# Patient Record
Sex: FEMALE | Race: WHITE | NOT HISPANIC OR LATINO | Employment: OTHER | URBAN - METROPOLITAN AREA
[De-identification: names, ages, dates, MRNs, and addresses within clinical notes are randomized per-mention and may not be internally consistent; named-entity substitution may affect disease eponyms.]

---

## 2017-03-13 ENCOUNTER — OUTPATIENT INFUSION SERVICES (OUTPATIENT)
Dept: ONCOLOGY | Facility: MEDICAL CENTER | Age: 72
End: 2017-03-13
Attending: FAMILY MEDICINE
Payer: MEDICARE

## 2017-03-13 VITALS
HEIGHT: 64 IN | DIASTOLIC BLOOD PRESSURE: 55 MMHG | RESPIRATION RATE: 18 BRPM | OXYGEN SATURATION: 93 % | SYSTOLIC BLOOD PRESSURE: 103 MMHG | TEMPERATURE: 97.5 F | HEART RATE: 62 BPM | BODY MASS INDEX: 21.23 KG/M2 | WEIGHT: 124.34 LBS

## 2017-03-13 LAB
CA-I BLD ISE-SCNC: 1.18 MMOL/L (ref 1.1–1.3)
CREAT BLD-MCNC: 0.8 MG/DL (ref 0.5–1.4)

## 2017-03-13 PROCEDURE — 700111 HCHG RX REV CODE 636 W/ 250 OVERRIDE (IP): Performed by: FAMILY MEDICINE

## 2017-03-13 PROCEDURE — 36415 COLL VENOUS BLD VENIPUNCTURE: CPT

## 2017-03-13 PROCEDURE — 82565 ASSAY OF CREATININE: CPT

## 2017-03-13 PROCEDURE — 82330 ASSAY OF CALCIUM: CPT

## 2017-03-13 PROCEDURE — 96365 THER/PROPH/DIAG IV INF INIT: CPT

## 2017-03-13 RX ORDER — NIFEDIPINE 10 MG/1
10 CAPSULE ORAL 3 TIMES DAILY
COMMUNITY

## 2017-03-13 RX ORDER — ESCITALOPRAM OXALATE 10 MG/1
10 TABLET ORAL DAILY
COMMUNITY

## 2017-03-13 RX ORDER — ZOLEDRONIC ACID 5 MG/100ML
5 INJECTION, SOLUTION INTRAVENOUS ONCE
Status: COMPLETED | OUTPATIENT
Start: 2017-03-13 | End: 2017-03-13

## 2017-03-13 RX ADMIN — ZOLEDRONIC ACID 5 MG: 0.05 INJECTION, SOLUTION INTRAVENOUS at 10:25

## 2017-03-13 ASSESSMENT — PAIN SCALES - GENERAL: PAINLEVEL: NO PAIN

## 2017-03-13 NOTE — PROGRESS NOTES
Pharmacy Note:    Scr = 0.8,  est crcl ~ 55.8ml/min  iCalcium 1.18  Previous Reclast at [facility] = none    Ok to proceed with reclast 5 mg infusion  KACEY Kimble [employer]iar[employer].

## 2017-03-14 NOTE — PROGRESS NOTES
Pt arrives ambulatory to [facility] accompanied by self.  Pt is here for Reclast.  Labs drawn as ordered and results reviewed.  Reclast infused as ordered.    Discussed post Reclast care, pt states understanding and compliance with same.  Pt states she has received Reclast in the past in [place], and she tells me she has never had any problems during or after.  Pt tolerated Reclast well, no evidence of adverse effects noted or expressed.  Pt dc'd to self care in no apparent distress.  Discussed returning in one year, pt states understanding.

## 2017-04-25 ENCOUNTER — APPOINTMENT (OUTPATIENT)
Dept: RADIOLOGY | Facility: MEDICAL CENTER | Age: 72
End: 2017-04-25
Attending: EMERGENCY MEDICINE
Payer: MEDICARE

## 2017-04-25 ENCOUNTER — HOSPITAL ENCOUNTER (EMERGENCY)
Facility: MEDICAL CENTER | Age: 72
End: 2017-04-25
Attending: EMERGENCY MEDICINE
Payer: MEDICARE

## 2017-04-25 VITALS
RESPIRATION RATE: 18 BRPM | HEIGHT: 64 IN | HEART RATE: 56 BPM | WEIGHT: 120.37 LBS | OXYGEN SATURATION: 96 % | SYSTOLIC BLOOD PRESSURE: 188 MMHG | BODY MASS INDEX: 20.55 KG/M2 | DIASTOLIC BLOOD PRESSURE: 93 MMHG | TEMPERATURE: 97.4 F

## 2017-04-25 DIAGNOSIS — S39.012A LOW BACK STRAIN, INITIAL ENCOUNTER: ICD-10-CM

## 2017-04-25 DIAGNOSIS — S70.02XA CONTUSION OF LEFT HIP, INITIAL ENCOUNTER: ICD-10-CM

## 2017-04-25 PROCEDURE — 99284 EMERGENCY DEPT VISIT MOD MDM: CPT

## 2017-04-25 PROCEDURE — 72100 X-RAY EXAM L-S SPINE 2/3 VWS: CPT

## 2017-04-25 PROCEDURE — 72170 X-RAY EXAM OF PELVIS: CPT

## 2017-04-25 ASSESSMENT — PAIN SCALES - GENERAL: PAINLEVEL_OUTOF10: 4

## 2017-04-25 NOTE — ED AVS SNAPSHOT
4/25/2017    Priscilla Alonzo  [address]   [place] [address]    Dear Priscilla:    [facility] wants to ensure your discharge home is safe and you or your loved ones have had all of your questions answered regarding your care after you leave the hospital.    Below is a list of resources and contact information should you have any questions regarding your hospital stay, follow-up instructions, or active medical symptoms.    Questions or Concerns Regarding… Contact   Medical Questions Related to Your Discharge  (7 days a week, 8am-5pm) Contact a Nurse Care Coordinator   [phone]   Medical Questions Not Related to Your Discharge  (24 hours a day / 7 days a week)  Contact the Nurse Health Line   [phone]    Medications or Discharge Instructions Refer to your discharge packet   or contact your [facility] Primary Care Provider   [phone]   Follow-up Appointment(s) Schedule your appointment via [employer]   or contact Scheduling [phone]   Billing Review your statement via [employer]  or contact Billing [phone]   Medical Records Review your records via [employer]   or contact Medical Records [phone]     You may receive a telephone call within two days of discharge. This call is to make certain you understand your discharge instructions and have the opportunity to have any questions answered. You can also easily access your medical information, test results and upcoming appointments via the [employer] free online health management tool. You can learn more and sign up at [employer]. For assistance setting up your [employer] account, please call [phone].    Once again, we want to ensure your discharge home is safe and that you have a clear understanding of any next steps in your care. If you have any questions or concerns, please do not hesitate to contact us, we are here for you. Thank you for choosing [facility] for your healthcare needs.    Sincerely,    Your [facility] Healthcare Team

## 2017-04-25 NOTE — ED AVS SNAPSHOT
Home Care Instructions                                                                                                                Priscilla Alonzo   MRN: [id number]    Department:  [facility], Emergency Dept   Date of Visit:  4/25/2017            [facility], Emergency Dept    [address]    [place] [address]    Phone:  [phone]      You were seen by     Shubham De La Cruz M.D.      Your Diagnosis Was     Low back strain, initial encounter     S39.012A       Follow-up Information     1. Follow up with Angie Alonso M.D..    Specialty:  Family Medicine    Why:  use tylenol, heat/ice, gentla massage, stretches as needed for pain. return to ER for changes/worsening    Contact information    [address]  [address]  [place] [address] [phone]          2. Follow up with Juanito Carey M.D..    Specialty:  Orthopaedics    Why:  As needed    Contact information    [address]  [address]  [place] [address] [phone]        Medication Information     Review all of your home medications and newly ordered medications with your primary doctor and/or pharmacist as soon as possible. Follow medication instructions as directed by your doctor and/or pharmacist.     Please keep your complete medication list with you and share with your physician. Update the information when medications are discontinued, doses are changed, or new medications (including over-the-counter products) are added; and carry medication information at all times in the event of emergency situations.               Medication List      ASK your doctor about these medications        Instructions    Morning Afternoon Evening Bedtime    AMBIEN 10 MG Tabs   Generic drug:  zolpidem        Take 5 mg by mouth at bedtime as needed.   Dose:  5 mg                        atenolol 50 MG Tabs   Commonly known as:  TENORMIN        BID.                        CITRACAL CALCIUM+D 600- MG-MG-UNIT Tb24      Generic drug:  Calcium-Magnesium-Vitamin D        Take  by mouth.                        LEXAPRO 10 MG Tabs   Generic drug:  escitalopram        Take 10 mg by mouth every day.   Dose:  10 mg                        MULTIVITAMINS PO        QDAY.                        NIFEdipine 10 MG Caps   Commonly known as:  PROCARDIA        Take 10 mg by mouth 3 times a day.   Dose:  10 mg                        SYNTHROID 88 MCG Tabs   Generic drug:  levothyroxine        QDAY.                        vitamin D 1000 UNIT Tabs   Commonly known as:  cholecalciferol        Take  by mouth every day.                        VIVELLE-DOT 0.0375 MG/24HR patch   Generic drug:  estradiol        Apply 1 Patch to skin as directed every 7 days. Use one patch two times per week.   Dose:  1 Patch                                Procedures and tests performed during your visit     DX-LUMBAR SPINE-2 OR 3 VIEWS    DX-PELVIS-1 OR 2 VIEWS        Discharge Instructions       Bone Bruise   A bone bruise is a small hidden fracture of the bone. It typically occurs with bones located close to the surface of the skin.   SYMPTOMS  · The pain lasts longer than a normal bruise.  · The bruised area is difficult to use.  · There may be discoloration or swelling of the bruised area.  · When a bone bruise is found with injury to the anterior cruciate ligament (in the knee) there is often an increased:  · Amount of fluid in the knee  · Time the fluid in the knee lasts.  · Number of days until you are walking normally and regaining the motion you had before the injury.  · Number of days with pain from the injury.  DIAGNOSIS   It can only be seen on X-rays known as MRIs. This stands for magnetic resonance imaging. A regular X-ray taken of a bone bruise would appear to be normal. A bone bruise is a common injury in the knee and the heel bone (calcaneus). The problems are similar to those produced by stress fractures, which are bone injuries caused by overuse. A bone  bruise may also be a sign of other injuries. For example, bone bruises are commonly found where an anterior cruciate ligament (ACL) in the knee has been pulled away from the bone (ruptured). A ligament is a tough fibrous material that connects bones together to make our joints stable. Bruises of the bone last a lot longer than bruises of the muscle or tissues beneath the skin. Bone bruises can last from days to months and are often more severe and painful than other bruises.  TREATMENT  Because bone bruises are sudden injuries you cannot often prevent them, other than by being extremely careful. Some things you can do to improve the condition are:  · Apply ice to the sore area for 15-20 minutes, 3-4 times per day while awake for the first 2 days. Put the ice in a plastic bag, and place a towel between the bag of ice and your skin.  · Keep your bruised area raised (elevated) when possible to lessen swelling.  · For activity:  · Use crutches when necessary; do not put weight on the injured leg until you are no longer tender.  · You may walk on your affected part as the pain allows, or as instructed.  · Start weight bearing gradually on the bruised part.  · Continue to use crutches or a cane until you can stand without causing pain, or as instructed.  · If a plaster splint was applied, wear the splint until you are seen for a follow-up examination. Rest it on nothing harder than a pillow the first 24 hours. Do not put weight on it. Do not get it wet. You may take it off to take a shower or bath.  · If an air splint was applied, more air may be blown into or out of the splint as needed for comfort. You may take it off at night and to take a shower or bath.  · Wiggle your toes in the splint several times per day if you are able.  · You may have been given an elastic bandage to use with the plaster splint or alone. The splint is too tight if you have numbness, tingling or if your foot becomes cold and blue. Adjust the  bandage to make it comfortable.  · Only take over-the-counter or prescription medicines for pain, discomfort, or fever as directed by your caregiver.  · Follow all instructions for follow up with your caregiver. This includes any orthopedic referrals, physical therapy, and rehabilitation. Any delay in obtaining necessary care could result in a delay or failure of the bones to heal.  SEEK MEDICAL CARE IF:   · You have an increase in bruising, swelling, or pain.  · You notice coldness of your toes.  · You do not get pain relief with medications.  SEEK IMMEDIATE MEDICAL CARE IF:   · Your toes are numb or blue.  · You have severe pain not controlled with medications.  · If any of the problems that caused you to seek care are becoming worse.  Document Released: 03/09/2005 Document Revised: 03/11/2013 Document Reviewed: 07/22/2009  [employer]Care® Patient Information ©2014 [employer].    Lumbosacral Strain  Lumbosacral strain is a strain of any of the parts that make up your lumbosacral vertebrae. Your lumbosacral vertebrae are the bones that make up the lower third of your backbone. Your lumbosacral vertebrae are held together by muscles and tough, fibrous tissue (ligaments).   CAUSES   A sudden blow to your back can cause lumbosacral strain. Also, anything that causes an excessive stretch of the muscles in the low back can cause this strain. This is typically seen when people exert themselves strenuously, fall, lift heavy objects, bend, or crouch repeatedly.  RISK FACTORS  · Physically demanding work.  · Participation in pushing or pulling sports or sports that require a sudden twist of the back (tennis, golf, baseball).  · Weight lifting.  · Excessive lower back curvature.  · Forward-tilted pelvis.  · Weak back or abdominal muscles or both.  · Tight hamstrings.  SIGNS AND SYMPTOMS   Lumbosacral strain may cause pain in the area of your injury or pain that moves (radiates) down your leg.   DIAGNOSIS  Your health care  provider can often diagnose lumbosacral strain through a physical exam. In some cases, you may need tests such as X-ray exams.   TREATMENT   Treatment for your lower back injury depends on many factors that your clinician will have to evaluate. However, most treatment will include the use of anti-inflammatory medicines.  HOME CARE INSTRUCTIONS   · Avoid hard physical activities (tennis, racquetball, waterskiing) if you are not in proper physical condition for it. This may aggravate or create problems.  · If you have a back problem, avoid sports requiring sudden body movements. Swimming and walking are generally safer activities.  · Maintain good posture.  · Maintain a healthy weight.  · For acute conditions, you may put ice on the injured area.  ¨ Put ice in a plastic bag.  ¨ Place a towel between your skin and the bag.  ¨ Leave the ice on for 20 minutes, 2-3 times a day.  · When the low back starts healing, stretching and strengthening exercises may be recommended.  SEEK MEDICAL CARE IF:  · Your back pain is getting worse.  · You experience severe back pain not relieved with medicines.  SEEK IMMEDIATE MEDICAL CARE IF:   · You have numbness, tingling, weakness, or problems with the use of your arms or legs.  · There is a change in bowel or bladder control.  · You have increasing pain in any area of the body, including your belly (abdomen).  · You notice shortness of breath, dizziness, or feel faint.  · You feel sick to your stomach (nauseous), are throwing up (vomiting), or become sweaty.  · You notice discoloration of your toes or legs, or your feet get very cold.  MAKE SURE YOU:   · Understand these instructions.  · Will watch your condition.  · Will get help right away if you are not doing well or get worse.     This information is not intended to replace advice given to you by your health care provider. Make sure you discuss any questions you have with your health care provider.     Document Released: 09/27/2006  Document Revised: 01/08/2016 Document Reviewed: 08/06/2014  Elsevier Interactive Patient Education ©2016 [employer]            Patient Information     Patient Information    Following emergency treatment: all patient requiring follow-up care must return either to a private physician or a clinic if your condition worsens before you are able to obtain further medical attention, please return to the emergency room.     Billing Information    At [facility], we work to make the billing process streamlined for our patients.  Our Representatives are here to answer any questions you may have regarding your hospital bill.  If you have insurance coverage and have supplied your insurance information to us, we will submit a claim to your insurer on your behalf.  Should you have any questions regarding your bill, we can be reached online or by phone as follows:  Online: You are able pay your bills online or live chat with our representatives about any billing questions you may have. We are here to help Monday - Friday from 8:00am to 7:30pm and 9:00am - 12:00pm on Saturdays.  Please visit [email or web address]  for more information.   Phone:  [phone] or [phone]    Please note that your emergency physician, surgeon, pathologist, radiologist, anesthesiologist, and other specialists are not employed by [facility] and will therefore bill separately for their services.  Please contact them directly for any questions concerning their bills at the numbers below:     Emergency Physician Services:  [phone]  [facility]:  [phone]  [facility]:  [phone]  [facility]:  [phone]    1. Your final bill may vary from the amount quoted upon discharge if all procedures are not complete at that time, or if your doctor has additional procedures of which we are not aware. You will receive an additional bill if you return to the Emergency  Department at [facility] for suture removal regardless of the facility of which the sutures were placed.     2. Please arrange for settlement of this account at the emergency registration.    3. All self-pay accounts are due in full at the time of treatment.  If you are unable to meet this obligation then payment is expected within 4-5 days.     4. If you have had radiology studies (CT, X-ray, Ultrasound, MRI), you have received a preliminary result during your emergency department visit. Please contact the radiology department [phone] to receive a copy of your final result. Please discuss the Final result with your primary physician or with the follow up physician provided.     Crisis Hotline:  [place] Crisis Hotline:  0-027-TQCKQIH or 1-223.705.6667  [place] Crisis Hotline:    1-974.408.7270 or 578-238-2399         ED Discharge Follow Up Questions    1. In order to provide you with very good care, we would like to follow up with a phone call in the next few days.  May we have your permission to contact you?     YES /  NO    2. What is the best phone number to call you? (       )_____-__________    3. What is the best time to call you?      Morning  /  Afternoon  /  Evening                   Patient Signature:  ____________________________________________________________    Date:  ____________________________________________________________

## 2017-04-25 NOTE — ED AVS SNAPSHOT
Isoflux Access Code: [email or web address]  Expires: 5/25/2017  7:49 PM    Your email address is not on file at [employer].  Email Addresses are required for you to sign up for [employer], please contact [phone] to verify your personal information and to provide your email address prior to attempting to register for Iso[employer].    Priscilla ADELE [place]  [address] [place] [address]    [employer]  A secure, online tool to manage your health information     [employer]’s Isoflux® is a secure, online tool that connects you to your personalized health information from the privacy of your home -- day or night - making it very easy for you to manage your healthcare. Once the activation process is completed, you can even access your medical information using the Isoflux karen, which is available for free in the Apple Karen store or Google Play store.     To learn more about Isoflux, visit [email or web address]    There are two levels of access available (as shown below):   My Chart Features  [facility] Primary Care Doctor [facility]  Specialists [facility]  Urgent  Care Non-[facility] Primary Care Doctor   Email your healthcare team securely and privately 24/7 X X X    Manage appointments: schedule your next appointment; view details of past/upcoming appointments X      Request prescription refills. X      View recent personal medical records, including lab and immunizations X X X X   View health record, including health history, allergies, medications X X X X   Read reports about your outpatient visits, procedures, consult and ER notes X X X X   See your discharge summary, which is a recap of your hospital and/or ER visit that includes your diagnosis, lab results, and care plan X X  X     How to register for Good[employer]:  Once your e-mail address has been verified, follow the following steps to sign up for Good[employer].     1. Go to  [email or web address]  2. Click on the Sign Up Now box, which takes you to the New Member Sign Up  page. You will need to provide the following information:  a. Enter your [employer] Access Code exactly as it appears at the top of this page. (You will not need to use this code after you’ve completed the sign-up process. If you do not sign up before the expiration date, you must request a new code.)   b. Enter your date of birth.   c. Enter your home email address.   d. Click Submit, and follow the next screen’s instructions.  3. Create a [employer] ID. This will be your [employer] login ID and cannot be changed, so think of one that is secure and easy to remember.  4. Create a [employer] password. You can change your password at any time.  5. Enter your Password Reset Question and Answer. This can be used at a later time if you forget your password.   6. Enter your e-mail address. This allows you to receive e-mail notifications when new information is available in [employer].  7. Click Sign Up. You can now view your health information.    For assistance activating your [employer] account, call [phone]

## 2017-04-26 NOTE — ED NOTES
Discharge instructions given. Educated on when to return to ed. Pt verbalized understanding. Pt hypertensive at discharge. Dr De La Cruz aware. Pt states she takes atenolol at bedtime.

## 2017-04-26 NOTE — DISCHARGE INSTRUCTIONS
Bone Bruise   A bone bruise is a small hidden fracture of the bone. It typically occurs with bones located close to the surface of the skin.   SYMPTOMS  · The pain lasts longer than a normal bruise.  · The bruised area is difficult to use.  · There may be discoloration or swelling of the bruised area.  · When a bone bruise is found with injury to the anterior cruciate ligament (in the knee) there is often an increased:  · Amount of fluid in the knee  · Time the fluid in the knee lasts.  · Number of days until you are walking normally and regaining the motion you had before the injury.  · Number of days with pain from the injury.  DIAGNOSIS   It can only be seen on X-rays known as MRIs. This stands for magnetic resonance imaging. A regular X-ray taken of a bone bruise would appear to be normal. A bone bruise is a common injury in the knee and the heel bone (calcaneus). The problems are similar to those produced by stress fractures, which are bone injuries caused by overuse. A bone bruise may also be a sign of other injuries. For example, bone bruises are commonly found where an anterior cruciate ligament (ACL) in the knee has been pulled away from the bone (ruptured). A ligament is a tough fibrous material that connects bones together to make our joints stable. Bruises of the bone last a lot longer than bruises of the muscle or tissues beneath the skin. Bone bruises can last from days to months and are often more severe and painful than other bruises.  TREATMENT  Because bone bruises are sudden injuries you cannot often prevent them, other than by being extremely careful. Some things you can do to improve the condition are:  · Apply ice to the sore area for 15-20 minutes, 3-4 times per day while awake for the first 2 days. Put the ice in a plastic bag, and place a towel between the bag of ice and your skin.  · Keep your bruised area raised (elevated) when possible to lessen swelling.  · For activity:  · Use crutches  when necessary; do not put weight on the injured leg until you are no longer tender.  · You may walk on your affected part as the pain allows, or as instructed.  · Start weight bearing gradually on the bruised part.  · Continue to use crutches or a cane until you can stand without causing pain, or as instructed.  · If a plaster splint was applied, wear the splint until you are seen for a follow-up examination. Rest it on nothing harder than a pillow the first 24 hours. Do not put weight on it. Do not get it wet. You may take it off to take a shower or bath.  · If an air splint was applied, more air may be blown into or out of the splint as needed for comfort. You may take it off at night and to take a shower or bath.  · Wiggle your toes in the splint several times per day if you are able.  · You may have been given an elastic bandage to use with the plaster splint or alone. The splint is too tight if you have numbness, tingling or if your foot becomes cold and blue. Adjust the bandage to make it comfortable.  · Only take over-the-counter or prescription medicines for pain, discomfort, or fever as directed by your caregiver.  · Follow all instructions for follow up with your caregiver. This includes any orthopedic referrals, physical therapy, and rehabilitation. Any delay in obtaining necessary care could result in a delay or failure of the bones to heal.  SEEK MEDICAL CARE IF:   · You have an increase in bruising, swelling, or pain.  · You notice coldness of your toes.  · You do not get pain relief with medications.  SEEK IMMEDIATE MEDICAL CARE IF:   · Your toes are numb or blue.  · You have severe pain not controlled with medications.  · If any of the problems that caused you to seek care are becoming worse.  Document Released: 03/09/2005 Document Revised: 03/11/2013 Document Reviewed: 07/22/2009  [employer]® Patient Information ©2014 [employer].    Lumbosacral Strain  Lumbosacral strain is a strain of any of the  parts that make up your lumbosacral vertebrae. Your lumbosacral vertebrae are the bones that make up the lower third of your backbone. Your lumbosacral vertebrae are held together by muscles and tough, fibrous tissue (ligaments).   CAUSES   A sudden blow to your back can cause lumbosacral strain. Also, anything that causes an excessive stretch of the muscles in the low back can cause this strain. This is typically seen when people exert themselves strenuously, fall, lift heavy objects, bend, or crouch repeatedly.  RISK FACTORS  · Physically demanding work.  · Participation in pushing or pulling sports or sports that require a sudden twist of the back (tennis, golf, baseball).  · Weight lifting.  · Excessive lower back curvature.  · Forward-tilted pelvis.  · Weak back or abdominal muscles or both.  · Tight hamstrings.  SIGNS AND SYMPTOMS   Lumbosacral strain may cause pain in the area of your injury or pain that moves (radiates) down your leg.   DIAGNOSIS  Your health care provider can often diagnose lumbosacral strain through a physical exam. In some cases, you may need tests such as X-ray exams.   TREATMENT   Treatment for your lower back injury depends on many factors that your clinician will have to evaluate. However, most treatment will include the use of anti-inflammatory medicines.  HOME CARE INSTRUCTIONS   · Avoid hard physical activities (tennis, racquetball, waterskiing) if you are not in proper physical condition for it. This may aggravate or create problems.  · If you have a back problem, avoid sports requiring sudden body movements. Swimming and walking are generally safer activities.  · Maintain good posture.  · Maintain a healthy weight.  · For acute conditions, you may put ice on the injured area.  ¨ Put ice in a plastic bag.  ¨ Place a towel between your skin and the bag.  ¨ Leave the ice on for 20 minutes, 2-3 times a day.  · When the low back starts healing, stretching and strengthening exercises  may be recommended.  SEEK MEDICAL CARE IF:  · Your back pain is getting worse.  · You experience severe back pain not relieved with medicines.  SEEK IMMEDIATE MEDICAL CARE IF:   · You have numbness, tingling, weakness, or problems with the use of your arms or legs.  · There is a change in bowel or bladder control.  · You have increasing pain in any area of the body, including your belly (abdomen).  · You notice shortness of breath, dizziness, or feel faint.  · You feel sick to your stomach (nauseous), are throwing up (vomiting), or become sweaty.  · You notice discoloration of your toes or legs, or your feet get very cold.  MAKE SURE YOU:   · Understand these instructions.  · Will watch your condition.  · Will get help right away if you are not doing well or get worse.     This information is not intended to replace advice given to you by your health care provider. Make sure you discuss any questions you have with your health care provider.     Document Released: 09/27/2006 Document Revised: 01/08/2016 Document Reviewed: 08/06/2014  [employer] Interactive Patient Education ©2016 [employer]

## 2017-04-26 NOTE — ED NOTES
Pt taken for a walk around the ED, she felt very comfortable on her feet and looked very stable walking.

## 2017-04-26 NOTE — ED NOTES
"Pt stated went to the [facility] and did not get a urine sample.  \"No one told me.\"  Pt aware need urine sample    "

## 2017-04-26 NOTE — ED PROVIDER NOTES
ED Provider Note    CHIEF COMPLAINT  Chief Complaint   Patient presents with   • Fall   • Hip Injury       HPI  Priscilla Alonzo is a 71 y.o. female who presents to the emergency department complaining of left low back and hip discomfort after mechanical fall. Past medical history is documented in review with patient. She said that she was pulling a cristina backwards along the side of her house to move a pot when she tripped over some nearby bricks. As she fell backwards the left low back grazed a nearby propane canister before she hit the ground. She did not hit her head. No loss of consciousness. No neck or upper back pain. Endotracheal the event. Event happened approximately 3 hours ago. No medications taken prior to arrival. Pain is currently minimal. No significant aggravating factors. No numbness or tingling.    REVIEW OF SYSTEMS  See HPI for further details. All other systems are negative.     PAST MEDICAL HISTORY   has a past medical history of Anesthesia; Joint replacement; Unspecified hemorrhagic conditions (CMS-HCC); Unspecified disorder of thyroid; Hypertension; Indigestion; and Cancer (CMS-HCC).    SOCIAL HISTORY  Social History     Social History Main Topics   • Smoking status: Never Smoker    • Smokeless tobacco: Never Used   • Alcohol Use: Yes      Comment: 0-2 per week   • Drug Use: No   • Sexual Activity: Not on file       SURGICAL HISTORY   has past surgical history that includes cervical fusion posterior (2005); hysterectomy, total abdominal (1985); zeinab by laparoscopy (7/28/08); liver biopsy laparoscopic (7/28/08); shoulder arthroscopy (1995); cystoscopy (1/20/2009); ureteral dilatation (1/20/2009); cystoscopy (3/31/2009); lumbar laminectomy diskectomy (1/8/2012); lumbar decompression (1/8/2012); gyn surgery; anterior and posterior repair (1/20/2009); anterior repair (3/31/2009); anterior repair (8/28/2012); posterior repair (8/28/2012); and cystoscopy (8/28/2012).    CURRENT  "MEDICATIONS  Home Medications     Reviewed by Mk Aguilera R.N. (Registered Nurse) on 04/25/17 at 1716  Med List Status: Complete    Medication Last Dose Status    ATENOLOL 50 MG PO TABS 4/25/2017 Active    Calcium-Magnesium-Vitamin D (CITRACAL CALCIUM+D) 600- MG-MG-UNIT TB24 taking Active    escitalopram (LEXAPRO) 10 MG Tab 4/24/2017 Active    estradiol (VIVELLE-DOT) 0.0375 MG/24HR patch 4/24/2017 Active    MULTIVITAMINS PO taking Active    NIFEdipine (PROCARDIA) 10 MG Cap 4/25/2017 Active    SYNTHROID 88 MCG PO TABS 4/25/2017 Active    VITAMIN D 1000 UNIT PO TABS taking Active    zolpidem (AMBIEN) 10 MG TABS taking Active                ALLERGIES  Allergies   Allergen Reactions   • Actonel [Risedronate Sodium]      Swelling in knees   • Boniva [Ibandronate Sodium] Vomiting   • Levaquin      Aching back of knees       PHYSICAL EXAM  VITAL SIGNS: /84 mmHg  Pulse 60  Temp(Src) 36.3 °C (97.4 °F)  Resp 18  Ht 1.626 m (5' 4\")  Wt 54.6 kg (120 lb 5.9 oz)  BMI 20.65 kg/m2  SpO2 96%   Pulse ox interpretation: I interpret this pulse ox as normal.  Constitutional: Alert in no apparent distress.  HENT: No signs of trauma, Bilateral external ears normal, Nose normal.   Eyes: Pupils are equal and reactive, Conjunctiva normal, Non-icteric.   Neck: Normal range of motion, No tenderness, Supple, No stridor.   Lymphatic: No lymphadenopathy noted.   Cardiovascular: Regular rate and rhythm, no murmurs.   Thorax & Lungs: Normal breath sounds, No respiratory distress, No wheezing, No chest tenderness.   Abdomen: Bowel sounds normal, Soft, No tenderness, No masses, No pulsatile masses. No peritoneal signs.  Skin: Warm, Dry, No erythema, No rash.   Back: No bony tenderness, left paralumbar muscular tenderness as well as superior SI joint tenderness on the left.  Extremities: Intact distal pulses, No edema, No tenderness, No cyanosis, strength 5 out of 5 all extremities.  Musculoskeletal: Good range of motion in " all major joints. No tenderness to palpation or major deformities noted.   Neurologic: Alert , Normal motor function, Normal sensory function, No focal deficits noted.   Psychiatric: Affect normal, Judgment normal, Mood normal.       DIAGNOSTIC STUDIES / PROCEDURES        RADIOLOGY  DX-PELVIS-1 OR 2 VIEWS   Final Result         1.  Normal single AP view of the pelvis.      DX-LUMBAR SPINE-2 OR 3 VIEWS   Final Result      1.  Minimal levoscoliosis mid lumbar spine.      2.  No evidence of acute fracture and/or subluxation.              COURSE & MEDICAL DECISION MAKING  Pertinent Labs & Imaging studies reviewed. (See chart for details)  Patient presented to the emergency department after mechanical ground level fall. History and physical exam as above. No acute findings of trauma on exam other than minimal tenderness as described above. Imaging does not reveal any acute bony abnormalities. Likely low back strain with bony contusion of pelvis. She had limited without difficulty. We'll discharge home with outpatient ball by primary care physician or orthopedics as needed. The patient is understanding of reasons for emergency return to ER. She will continue conservative over-the-counter medications and therapy otherwise. Screening vital signs do show elevated blood pressure. This may be secondary to current minimal pain however the patient will continue to have this monitored and treated as an outpatient as needed and directed by her primary care physician.     The patient will return for worsening symptoms and is stable at the time of discharge. The patient verbalizes understanding and will comply.    FINAL IMPRESSION  1. Low back strain, initial encounter    2. Contusion of left hip, initial encounter            Electronically signed by: Shubham De La Cruz, 4/25/2017 6:42 PM

## 2017-12-15 ENCOUNTER — APPOINTMENT (RX ONLY)
Dept: URBAN - METROPOLITAN AREA CLINIC 4 | Facility: CLINIC | Age: 72
Setting detail: DERMATOLOGY
End: 2017-12-15

## 2017-12-15 DIAGNOSIS — D22 MELANOCYTIC NEVI: ICD-10-CM

## 2017-12-15 DIAGNOSIS — L80 VITILIGO: ICD-10-CM

## 2017-12-15 DIAGNOSIS — L57.0 ACTINIC KERATOSIS: ICD-10-CM

## 2017-12-15 DIAGNOSIS — D18.0 HEMANGIOMA: ICD-10-CM

## 2017-12-15 DIAGNOSIS — L82.1 OTHER SEBORRHEIC KERATOSIS: ICD-10-CM

## 2017-12-15 DIAGNOSIS — L82.0 INFLAMED SEBORRHEIC KERATOSIS: ICD-10-CM

## 2017-12-15 DIAGNOSIS — D17 BENIGN LIPOMATOUS NEOPLASM: ICD-10-CM

## 2017-12-15 DIAGNOSIS — L81.4 OTHER MELANIN HYPERPIGMENTATION: ICD-10-CM

## 2017-12-15 PROBLEM — D22.62 MELANOCYTIC NEVI OF LEFT UPPER LIMB, INCLUDING SHOULDER: Status: ACTIVE | Noted: 2017-12-15

## 2017-12-15 PROBLEM — D22.71 MELANOCYTIC NEVI OF RIGHT LOWER LIMB, INCLUDING HIP: Status: ACTIVE | Noted: 2017-12-15

## 2017-12-15 PROBLEM — D22.5 MELANOCYTIC NEVI OF TRUNK: Status: ACTIVE | Noted: 2017-12-15

## 2017-12-15 PROBLEM — D17.24 BENIGN LIPOMATOUS NEOPLASM OF SKIN AND SUBCUTANEOUS TISSUE OF LEFT LEG: Status: ACTIVE | Noted: 2017-12-15

## 2017-12-15 PROBLEM — D22.61 MELANOCYTIC NEVI OF RIGHT UPPER LIMB, INCLUDING SHOULDER: Status: ACTIVE | Noted: 2017-12-15

## 2017-12-15 PROBLEM — D22.72 MELANOCYTIC NEVI OF LEFT LOWER LIMB, INCLUDING HIP: Status: ACTIVE | Noted: 2017-12-15

## 2017-12-15 PROBLEM — D18.01 HEMANGIOMA OF SKIN AND SUBCUTANEOUS TISSUE: Status: ACTIVE | Noted: 2017-12-15

## 2017-12-15 PROCEDURE — 17000 DESTRUCT PREMALG LESION: CPT | Mod: 59

## 2017-12-15 PROCEDURE — 17110 DESTRUCTION B9 LES UP TO 14: CPT

## 2017-12-15 PROCEDURE — ? LIQUID NITROGEN

## 2017-12-15 PROCEDURE — ? SUNSCREEN RECOMMENDATIONS

## 2017-12-15 PROCEDURE — ? COUNSELING

## 2017-12-15 PROCEDURE — 17003 DESTRUCT PREMALG LES 2-14: CPT

## 2017-12-15 PROCEDURE — 99213 OFFICE O/P EST LOW 20 MIN: CPT | Mod: 25

## 2017-12-15 ASSESSMENT — LOCATION SIMPLE DESCRIPTION DERM
LOCATION SIMPLE: RIGHT LOWER BACK
LOCATION SIMPLE: RIGHT POSTERIOR UPPER ARM
LOCATION SIMPLE: LEFT POPLITEAL SKIN
LOCATION SIMPLE: LEFT CHEEK
LOCATION SIMPLE: LEFT UPPER BACK
LOCATION SIMPLE: LEFT POSTERIOR UPPER ARM
LOCATION SIMPLE: LEFT EAR
LOCATION SIMPLE: ABDOMEN
LOCATION SIMPLE: LEFT FOREARM
LOCATION SIMPLE: RIGHT FOREHEAD
LOCATION SIMPLE: RIGHT CHEEK
LOCATION SIMPLE: RIGHT POPLITEAL SKIN
LOCATION SIMPLE: CHEST
LOCATION SIMPLE: LEFT HAND
LOCATION SIMPLE: UPPER BACK
LOCATION SIMPLE: LEFT FOOT
LOCATION SIMPLE: RIGHT THIGH
LOCATION SIMPLE: LEFT ANTERIOR NECK
LOCATION SIMPLE: RIGHT HAND
LOCATION SIMPLE: RIGHT FOREARM
LOCATION SIMPLE: LEFT THIGH

## 2017-12-15 ASSESSMENT — LOCATION DETAILED DESCRIPTION DERM
LOCATION DETAILED: RIGHT RADIAL DORSAL HAND
LOCATION DETAILED: RIGHT POPLITEAL SKIN
LOCATION DETAILED: RIGHT PROXIMAL DORSAL FOREARM
LOCATION DETAILED: STERNAL NOTCH
LOCATION DETAILED: LEFT LATERAL ACHILLES SKIN
LOCATION DETAILED: LEFT ANTERIOR PROXIMAL THIGH
LOCATION DETAILED: RIGHT RIB CAGE
LOCATION DETAILED: PERIUMBILICAL SKIN
LOCATION DETAILED: RIGHT MEDIAL SUPERIOR CHEST
LOCATION DETAILED: LEFT CENTRAL MALAR CHEEK
LOCATION DETAILED: RIGHT DISTAL POSTERIOR UPPER ARM
LOCATION DETAILED: LEFT PROXIMAL DORSAL FOREARM
LOCATION DETAILED: LEFT ULNAR DORSAL HAND
LOCATION DETAILED: LEFT INFERIOR HELIX
LOCATION DETAILED: RIGHT SUPERIOR MEDIAL MIDBACK
LOCATION DETAILED: LEFT SUPERIOR MEDIAL UPPER BACK
LOCATION DETAILED: RIGHT CENTRAL MALAR CHEEK
LOCATION DETAILED: LEFT POPLITEAL SKIN
LOCATION DETAILED: LEFT INFERIOR ANTERIOR NECK
LOCATION DETAILED: RIGHT ANTERIOR PROXIMAL THIGH
LOCATION DETAILED: LEFT PROXIMAL POSTERIOR UPPER ARM
LOCATION DETAILED: RIGHT INFERIOR FOREHEAD
LOCATION DETAILED: SUPERIOR THORACIC SPINE

## 2017-12-15 ASSESSMENT — LOCATION ZONE DERM
LOCATION ZONE: TRUNK
LOCATION ZONE: HAND
LOCATION ZONE: NECK
LOCATION ZONE: ARM
LOCATION ZONE: FACE
LOCATION ZONE: LEG
LOCATION ZONE: EAR
LOCATION ZONE: FEET

## 2017-12-15 NOTE — PROCEDURE: LIQUID NITROGEN
Post-Care Instructions: I reviewed with the patient in detail post-care instructions. Patient is to wear sunprotection, and avoid picking at any of the treated lesions. Pt may apply Vaseline to crusted or scabbing areas.
Detail Level: Detailed
Duration Of Freeze Thaw-Cycle (Seconds): 0
Render Post-Care Instructions In Note?: no
Consent: The patient's consent was obtained including but not limited to risks of crusting, scabbing, blistering, scarring, darker or lighter pigmentary change, recurrence, incomplete removal and infection.
Medical Necessity Information: It is in your best interest to select a reason for this procedure from the list below. All of these items fulfill various CMS LCD requirements except the new and changing color options.
Medical Necessity Clause: This procedure was medically necessary because the lesions that were treated were:

## 2018-07-10 ENCOUNTER — APPOINTMENT (RX ONLY)
Dept: URBAN - METROPOLITAN AREA CLINIC 4 | Facility: CLINIC | Age: 73
Setting detail: DERMATOLOGY
End: 2018-07-10

## 2018-07-10 DIAGNOSIS — L80 VITILIGO: ICD-10-CM

## 2018-07-10 DIAGNOSIS — L82.0 INFLAMED SEBORRHEIC KERATOSIS: ICD-10-CM

## 2018-07-10 DIAGNOSIS — Z71.89 OTHER SPECIFIED COUNSELING: ICD-10-CM

## 2018-07-10 PROBLEM — D48.5 NEOPLASM OF UNCERTAIN BEHAVIOR OF SKIN: Status: ACTIVE | Noted: 2018-07-10

## 2018-07-10 PROBLEM — E03.9 HYPOTHYROIDISM, UNSPECIFIED: Status: ACTIVE | Noted: 2018-07-10

## 2018-07-10 PROCEDURE — 99213 OFFICE O/P EST LOW 20 MIN: CPT | Mod: 25

## 2018-07-10 PROCEDURE — ? BIOPSY BY SHAVE METHOD

## 2018-07-10 PROCEDURE — 11100: CPT

## 2018-07-10 PROCEDURE — ? COUNSELING

## 2018-07-10 ASSESSMENT — LOCATION DETAILED DESCRIPTION DERM
LOCATION DETAILED: LEFT ULNAR DORSAL HAND
LOCATION DETAILED: STERNAL NOTCH
LOCATION DETAILED: RIGHT RADIAL DORSAL HAND
LOCATION DETAILED: RIGHT POSTERIOR SHOULDER

## 2018-07-10 ASSESSMENT — LOCATION ZONE DERM
LOCATION ZONE: TRUNK
LOCATION ZONE: ARM
LOCATION ZONE: HAND

## 2018-07-10 ASSESSMENT — LOCATION SIMPLE DESCRIPTION DERM
LOCATION SIMPLE: LEFT HAND
LOCATION SIMPLE: RIGHT SHOULDER
LOCATION SIMPLE: RIGHT HAND
LOCATION SIMPLE: CHEST

## 2018-07-10 NOTE — PROCEDURE: BIOPSY BY SHAVE METHOD
Hemostasis: Drysol
Lab: [id number]
Size Of Lesion In Cm: 0
Lab Facility: 
Notification Instructions: Patient will be notified of biopsy results. However, patient instructed to call the office if not contacted within 2 weeks.
Detail Level: Detailed
Bill 23032 For Specimen Handling/Conveyance To Laboratory?: no
Biopsy Type: H and E
Curettage Text: The wound bed was treated with curettage after the biopsy was performed.
Billing Type: Third-Party Bill
Biopsy Method: Personna blade
Wound Care: Bacitracin
Post-Care Instructions: I reviewed with the patient in detail post-care instructions. Patient is to keep the biopsy site dry overnight, and then apply bacitracin twice daily until healed. Patient may apply hydrogen peroxide soaks to remove any crusting.
Anesthesia Volume In Cc: 2
Cryotherapy Text: The wound bed was treated with cryotherapy after the biopsy was performed.
Anesthesia Type: 1% lidocaine with epinephrine
Consent: Written consent was obtained and risks were reviewed including but not limited to scarring, infection, bleeding, scabbing, incomplete removal, nerve damage and allergy to anesthesia.
Was A Bandage Applied: Yes
Type Of Destruction Used: Curettage
Dressing: bandage
Electrodesiccation And Curettage Text: The wound bed was treated with electrodesiccation and curettage after the biopsy was performed.
Silver Nitrate Text: The wound bed was treated with silver nitrate after the biopsy was performed.
Depth Of Biopsy: dermis
Electrodesiccation Text: The wound bed was treated with electrodesiccation after the biopsy was performed.

## 2018-12-17 ENCOUNTER — APPOINTMENT (RX ONLY)
Dept: URBAN - METROPOLITAN AREA CLINIC 4 | Facility: CLINIC | Age: 73
Setting detail: DERMATOLOGY
End: 2018-12-17

## 2018-12-17 DIAGNOSIS — L82.1 OTHER SEBORRHEIC KERATOSIS: ICD-10-CM

## 2018-12-17 DIAGNOSIS — L81.4 OTHER MELANIN HYPERPIGMENTATION: ICD-10-CM

## 2018-12-17 DIAGNOSIS — D22 MELANOCYTIC NEVI: ICD-10-CM

## 2018-12-17 DIAGNOSIS — D18.0 HEMANGIOMA: ICD-10-CM

## 2018-12-17 DIAGNOSIS — L80 VITILIGO: ICD-10-CM

## 2018-12-17 PROBLEM — D22.62 MELANOCYTIC NEVI OF LEFT UPPER LIMB, INCLUDING SHOULDER: Status: ACTIVE | Noted: 2018-12-17

## 2018-12-17 PROBLEM — D22.72 MELANOCYTIC NEVI OF LEFT LOWER LIMB, INCLUDING HIP: Status: ACTIVE | Noted: 2018-12-17

## 2018-12-17 PROBLEM — D22.5 MELANOCYTIC NEVI OF TRUNK: Status: ACTIVE | Noted: 2018-12-17

## 2018-12-17 PROBLEM — D22.61 MELANOCYTIC NEVI OF RIGHT UPPER LIMB, INCLUDING SHOULDER: Status: ACTIVE | Noted: 2018-12-17

## 2018-12-17 PROBLEM — D18.01 HEMANGIOMA OF SKIN AND SUBCUTANEOUS TISSUE: Status: ACTIVE | Noted: 2018-12-17

## 2018-12-17 PROBLEM — D22.71 MELANOCYTIC NEVI OF RIGHT LOWER LIMB, INCLUDING HIP: Status: ACTIVE | Noted: 2018-12-17

## 2018-12-17 PROCEDURE — ? COUNSELING

## 2018-12-17 PROCEDURE — 99213 OFFICE O/P EST LOW 20 MIN: CPT

## 2018-12-17 PROCEDURE — ? SUNSCREEN RECOMMENDATIONS

## 2018-12-17 ASSESSMENT — LOCATION DETAILED DESCRIPTION DERM
LOCATION DETAILED: RIGHT RADIAL DORSAL HAND
LOCATION DETAILED: LEFT INFERIOR ANTERIOR NECK
LOCATION DETAILED: LEFT ANTERIOR PROXIMAL THIGH
LOCATION DETAILED: LEFT ULNAR DORSAL HAND
LOCATION DETAILED: LEFT CENTRAL MALAR CHEEK
LOCATION DETAILED: LEFT PROXIMAL POSTERIOR UPPER ARM
LOCATION DETAILED: RIGHT CENTRAL MALAR CHEEK
LOCATION DETAILED: LEFT RADIAL DORSAL HAND
LOCATION DETAILED: RIGHT DISTAL POSTERIOR UPPER ARM
LOCATION DETAILED: RIGHT ANTERIOR PROXIMAL THIGH
LOCATION DETAILED: RIGHT PROXIMAL DORSAL FOREARM
LOCATION DETAILED: RIGHT SUPERIOR MEDIAL MIDBACK
LOCATION DETAILED: LEFT PROXIMAL DORSAL FOREARM
LOCATION DETAILED: PERIUMBILICAL SKIN
LOCATION DETAILED: RIGHT RIB CAGE
LOCATION DETAILED: RIGHT SUPERIOR MEDIAL UPPER BACK
LOCATION DETAILED: SUPERIOR THORACIC SPINE
LOCATION DETAILED: LEFT SUPERIOR MEDIAL UPPER BACK

## 2018-12-17 ASSESSMENT — LOCATION ZONE DERM
LOCATION ZONE: HAND
LOCATION ZONE: NECK
LOCATION ZONE: LEG
LOCATION ZONE: TRUNK
LOCATION ZONE: FACE
LOCATION ZONE: ARM

## 2018-12-17 ASSESSMENT — LOCATION SIMPLE DESCRIPTION DERM
LOCATION SIMPLE: RIGHT POSTERIOR UPPER ARM
LOCATION SIMPLE: RIGHT UPPER BACK
LOCATION SIMPLE: LEFT ANTERIOR NECK
LOCATION SIMPLE: RIGHT HAND
LOCATION SIMPLE: RIGHT CHEEK
LOCATION SIMPLE: LEFT UPPER BACK
LOCATION SIMPLE: LEFT POSTERIOR UPPER ARM
LOCATION SIMPLE: LEFT HAND
LOCATION SIMPLE: RIGHT LOWER BACK
LOCATION SIMPLE: LEFT CHEEK
LOCATION SIMPLE: RIGHT FOREARM
LOCATION SIMPLE: RIGHT THIGH
LOCATION SIMPLE: ABDOMEN
LOCATION SIMPLE: LEFT FOREARM
LOCATION SIMPLE: LEFT THIGH
LOCATION SIMPLE: UPPER BACK

## 2019-10-15 ENCOUNTER — RX ONLY (OUTPATIENT)
Age: 74
Setting detail: RX ONLY
End: 2019-10-15

## 2019-10-15 ENCOUNTER — APPOINTMENT (RX ONLY)
Dept: URBAN - METROPOLITAN AREA CLINIC 20 | Facility: CLINIC | Age: 74
Setting detail: DERMATOLOGY
End: 2019-10-15

## 2019-10-15 DIAGNOSIS — Z41.9 ENCOUNTER FOR PROCEDURE FOR PURPOSES OTHER THAN REMEDYING HEALTH STATE, UNSPECIFIED: ICD-10-CM

## 2019-10-15 PROCEDURE — ? CLEAR AND BRILLIANT LASER

## 2019-10-15 PROCEDURE — ? ADDITIONAL NOTES

## 2019-10-15 RX ORDER — ACYCLOVIR 400 MG/1
TABLET ORAL
Qty: 14 | Refills: 2 | Status: ERX | COMMUNITY
Start: 2019-10-15

## 2019-10-15 ASSESSMENT — LOCATION SIMPLE DESCRIPTION DERM: LOCATION SIMPLE: LEFT LIP

## 2019-10-15 ASSESSMENT — LOCATION DETAILED DESCRIPTION DERM: LOCATION DETAILED: LEFT LOWER CUTANEOUS LIP

## 2019-10-15 ASSESSMENT — LOCATION ZONE DERM: LOCATION ZONE: LIP

## 2019-10-15 NOTE — PROCEDURE: CLEAR AND BRILLIANT LASER
Post-Care Instructions: I reviewed with the patient in detail post-care instructions. Patient should stay away from the sun and wear sun protection until treated areas are fully healed.
Topical Anesthesia?: 23% lidocaine, 7% tetracaine
Energy Level: Medium
Length Of Topical Anesthesia Application (Optional): 30 minutes
Price (Use Numbers Only, No Special Characters Or $): 300.00
Detail Level: Zone
Consent: Written consent obtained, risks reviewed including but not limited to crusting, scabbing, blistering, scarring, darker or lighter pigmentary change, incidental hair removal, bruising, and/or incomplete removal.
Post-Procedure Care: Post care reviewed with patient.
Laser Type: Clear+Brilliant

## 2019-10-15 NOTE — PROCEDURE: ADDITIONAL NOTES
Additional Notes: Recommended series of BBL to continue to help clear pigment as well as broken vessels on face. Also recommended PCA spot pigment corrector. \\[employer] [facility]
Detail Level: Simple

## 2020-01-06 ENCOUNTER — APPOINTMENT (RX ONLY)
Dept: URBAN - METROPOLITAN AREA CLINIC 4 | Facility: CLINIC | Age: 75
Setting detail: DERMATOLOGY
End: 2020-01-06

## 2020-01-06 DIAGNOSIS — L82.1 OTHER SEBORRHEIC KERATOSIS: ICD-10-CM

## 2020-01-06 DIAGNOSIS — L57.0 ACTINIC KERATOSIS: ICD-10-CM

## 2020-01-06 DIAGNOSIS — Z85.828 PERSONAL HISTORY OF OTHER MALIGNANT NEOPLASM OF SKIN: ICD-10-CM

## 2020-01-06 DIAGNOSIS — D18.0 HEMANGIOMA: ICD-10-CM

## 2020-01-06 DIAGNOSIS — L81.4 OTHER MELANIN HYPERPIGMENTATION: ICD-10-CM

## 2020-01-06 DIAGNOSIS — D22 MELANOCYTIC NEVI: ICD-10-CM

## 2020-01-06 PROBLEM — D22.61 MELANOCYTIC NEVI OF RIGHT UPPER LIMB, INCLUDING SHOULDER: Status: ACTIVE | Noted: 2020-01-06

## 2020-01-06 PROBLEM — D18.01 HEMANGIOMA OF SKIN AND SUBCUTANEOUS TISSUE: Status: ACTIVE | Noted: 2020-01-06

## 2020-01-06 PROBLEM — D22.5 MELANOCYTIC NEVI OF TRUNK: Status: ACTIVE | Noted: 2020-01-06

## 2020-01-06 PROBLEM — D22.72 MELANOCYTIC NEVI OF LEFT LOWER LIMB, INCLUDING HIP: Status: ACTIVE | Noted: 2020-01-06

## 2020-01-06 PROBLEM — D22.71 MELANOCYTIC NEVI OF RIGHT LOWER LIMB, INCLUDING HIP: Status: ACTIVE | Noted: 2020-01-06

## 2020-01-06 PROBLEM — D22.62 MELANOCYTIC NEVI OF LEFT UPPER LIMB, INCLUDING SHOULDER: Status: ACTIVE | Noted: 2020-01-06

## 2020-01-06 PROCEDURE — 17000 DESTRUCT PREMALG LESION: CPT

## 2020-01-06 PROCEDURE — ? COUNSELING

## 2020-01-06 PROCEDURE — ? SUNSCREEN RECOMMENDATIONS

## 2020-01-06 PROCEDURE — ? LIQUID NITROGEN

## 2020-01-06 PROCEDURE — 99213 OFFICE O/P EST LOW 20 MIN: CPT | Mod: 25

## 2020-01-06 ASSESSMENT — LOCATION ZONE DERM
LOCATION ZONE: FACE
LOCATION ZONE: TRUNK
LOCATION ZONE: LEG
LOCATION ZONE: ARM
LOCATION ZONE: NECK
LOCATION ZONE: HAND
LOCATION ZONE: EAR

## 2020-01-06 ASSESSMENT — LOCATION DETAILED DESCRIPTION DERM
LOCATION DETAILED: LEFT PROXIMAL DORSAL FOREARM
LOCATION DETAILED: RIGHT ANTERIOR PROXIMAL THIGH
LOCATION DETAILED: LEFT INFERIOR HELIX
LOCATION DETAILED: PERIUMBILICAL SKIN
LOCATION DETAILED: LEFT PROXIMAL POSTERIOR UPPER ARM
LOCATION DETAILED: LEFT SUPERIOR MEDIAL UPPER BACK
LOCATION DETAILED: LEFT CENTRAL MALAR CHEEK
LOCATION DETAILED: LEFT ULNAR DORSAL HAND
LOCATION DETAILED: RIGHT DISTAL DORSAL FOREARM
LOCATION DETAILED: RIGHT RIB CAGE
LOCATION DETAILED: RIGHT PROXIMAL DORSAL FOREARM
LOCATION DETAILED: RIGHT SUPERIOR MEDIAL MIDBACK
LOCATION DETAILED: LEFT ANTERIOR PROXIMAL THIGH
LOCATION DETAILED: RIGHT CENTRAL MALAR CHEEK
LOCATION DETAILED: RIGHT DISTAL POSTERIOR UPPER ARM
LOCATION DETAILED: SUPERIOR THORACIC SPINE
LOCATION DETAILED: LEFT INFERIOR ANTERIOR NECK
LOCATION DETAILED: RIGHT RADIAL DORSAL HAND

## 2020-01-06 ASSESSMENT — LOCATION SIMPLE DESCRIPTION DERM
LOCATION SIMPLE: RIGHT LOWER BACK
LOCATION SIMPLE: LEFT HAND
LOCATION SIMPLE: RIGHT POSTERIOR UPPER ARM
LOCATION SIMPLE: LEFT THIGH
LOCATION SIMPLE: LEFT EAR
LOCATION SIMPLE: RIGHT CHEEK
LOCATION SIMPLE: LEFT ANTERIOR NECK
LOCATION SIMPLE: LEFT FOREARM
LOCATION SIMPLE: RIGHT FOREARM
LOCATION SIMPLE: LEFT CHEEK
LOCATION SIMPLE: LEFT POSTERIOR UPPER ARM
LOCATION SIMPLE: RIGHT THIGH
LOCATION SIMPLE: RIGHT HAND
LOCATION SIMPLE: ABDOMEN
LOCATION SIMPLE: LEFT UPPER BACK
LOCATION SIMPLE: UPPER BACK

## 2020-01-06 NOTE — HPI: FULL BODY SKIN EXAMINATION
How Severe Are Your Spot(S)?: mild
What Type Of Note Output Would You Prefer (Optional)?: Standard Output
What Is The Reason For Today's Visit?: Full Body Skin Examination
What Is The Reason For Today's Visit? (Being Monitored For X): concerning skin lesions on an annual basis
Additional History: Patient presents for a full body exam states she no concerns at this time. Has history of SCC right dorsal forearm 1980.

## 2020-01-06 NOTE — PROCEDURE: MIPS QUALITY
Quality 431: Preventive Care And Screening: Unhealthy Alcohol Use - Screening: Patient screened for unhealthy alcohol use using a single question and scores less than 2 times per year
Quality 110: Preventive Care And Screening: Influenza Immunization: Influenza Immunization Administered during Influenza season
Quality 111:Pneumonia Vaccination Status For Older Adults: Pneumococcal Vaccination Previously Received
Detail Level: Detailed
Quality 226: Preventive Care And Screening: Tobacco Use: Screening And Cessation Intervention: Patient screened for tobacco use and is an ex/non-smoker
Quality 130: Documentation Of Current Medications In The Medical Record: Current Medications Documented

## 2021-01-15 DIAGNOSIS — Z23 NEED FOR VACCINATION: ICD-10-CM

## 2021-04-12 ENCOUNTER — APPOINTMENT (RX ONLY)
Dept: URBAN - METROPOLITAN AREA CLINIC 20 | Facility: CLINIC | Age: 76
Setting detail: DERMATOLOGY
End: 2021-04-12

## 2021-04-12 DIAGNOSIS — Z41.9 ENCOUNTER FOR PROCEDURE FOR PURPOSES OTHER THAN REMEDYING HEALTH STATE, UNSPECIFIED: ICD-10-CM

## 2021-04-12 PROCEDURE — ? SCITON BBL

## 2021-04-12 ASSESSMENT — LOCATION SIMPLE DESCRIPTION DERM: LOCATION SIMPLE: CHIN

## 2021-04-12 ASSESSMENT — LOCATION ZONE DERM: LOCATION ZONE: FACE

## 2021-04-12 ASSESSMENT — LOCATION DETAILED DESCRIPTION DERM: LOCATION DETAILED: RIGHT CHIN

## 2021-04-12 NOTE — PROCEDURE: SCITON BBL
Cooling ?: Yes
Fluence (J/Cm2): 15
Spot Size: Finesse Adapter Size: 7 mm round
Treatment Number: 1
Anesthesia Volume In Cc: 0
Fluence (J/Cm2): 20
Detail Level: Zone
Fluence (J/Cm2): 25
Pulse Duration Units: milliseconds
Pulse Duration: 30
Preprocedure Text: The treatment areas were thoroughly cleaned. Clear ultrasound gel was applied to the treatment area. The area was treated with no immediate stacking of pulses.
Consent: Written consent obtained, risks reviewed including but not limited to crusting, scabbing, blistering, scarring, darker or lighter pigmentary change, bruising, and/or incomplete response.
Post Procedure Text: The patient tolerated the procedure well. Post care was reviewed with the patient.
Spot Size: Finesse Adapter Size: 15 x 15 mm square
Price (Use Numbers Only, No Special Characters Or $): 450.00
Fluence (J/Cm2): 12
Post-Care Instructions: I reviewed with the patient in detail post-care instructions. Patient should stay away from the sun and wear sun protection until treated areas are fully healed.
Passes: 2
Hide Repetition Rate?: No
Location Override (Will Not Show Above If Text Entered): spot tx

## 2021-04-21 ENCOUNTER — APPOINTMENT (RX ONLY)
Dept: URBAN - METROPOLITAN AREA CLINIC 4 | Facility: CLINIC | Age: 76
Setting detail: DERMATOLOGY
End: 2021-04-21

## 2021-04-21 DIAGNOSIS — D22 MELANOCYTIC NEVI: ICD-10-CM

## 2021-04-21 DIAGNOSIS — Z71.89 OTHER SPECIFIED COUNSELING: ICD-10-CM

## 2021-04-21 DIAGNOSIS — D18.0 HEMANGIOMA: ICD-10-CM

## 2021-04-21 DIAGNOSIS — L80 VITILIGO: ICD-10-CM

## 2021-04-21 DIAGNOSIS — L81.4 OTHER MELANIN HYPERPIGMENTATION: ICD-10-CM

## 2021-04-21 DIAGNOSIS — L82.1 OTHER SEBORRHEIC KERATOSIS: ICD-10-CM

## 2021-04-21 DIAGNOSIS — L57.0 ACTINIC KERATOSIS: ICD-10-CM

## 2021-04-21 PROBLEM — D22.9 MELANOCYTIC NEVI, UNSPECIFIED: Status: ACTIVE | Noted: 2021-04-21

## 2021-04-21 PROBLEM — D18.01 HEMANGIOMA OF SKIN AND SUBCUTANEOUS TISSUE: Status: ACTIVE | Noted: 2021-04-21

## 2021-04-21 PROCEDURE — ? COUNSELING

## 2021-04-21 PROCEDURE — 99213 OFFICE O/P EST LOW 20 MIN: CPT | Mod: 25

## 2021-04-21 PROCEDURE — ? LIQUID NITROGEN

## 2021-04-21 PROCEDURE — ? SUNSCREEN RECOMMENDATIONS

## 2021-04-21 PROCEDURE — 17000 DESTRUCT PREMALG LESION: CPT

## 2021-04-21 ASSESSMENT — LOCATION SIMPLE DESCRIPTION DERM
LOCATION SIMPLE: CHEST
LOCATION SIMPLE: RIGHT HAND
LOCATION SIMPLE: LEFT FOREARM
LOCATION SIMPLE: LEFT HAND
LOCATION SIMPLE: POSTERIOR NECK

## 2021-04-21 ASSESSMENT — LOCATION ZONE DERM
LOCATION ZONE: HAND
LOCATION ZONE: TRUNK
LOCATION ZONE: NECK
LOCATION ZONE: ARM

## 2021-04-21 ASSESSMENT — LOCATION DETAILED DESCRIPTION DERM
LOCATION DETAILED: STERNAL NOTCH
LOCATION DETAILED: RIGHT LATERAL TRAPEZIAL NECK
LOCATION DETAILED: RIGHT RADIAL DORSAL HAND
LOCATION DETAILED: LEFT RADIAL DORSAL HAND
LOCATION DETAILED: LEFT PROXIMAL DORSAL FOREARM

## 2021-04-21 NOTE — PROCEDURE: LIQUID NITROGEN
Render Note In Bullet Format When Appropriate: No
Post-Care Instructions: I reviewed with the patient in detail post-care instructions. Patient is to wear sunprotection, and avoid picking at any of the treated lesions. Pt may apply Vaseline to crusted or scabbing areas.
Number Of Freeze-Thaw Cycles: 2 freeze-thaw cycles
Detail Level: Simple
Consent: The patient's consent was obtained including but not limited to risks of crusting, scabbing, blistering, scarring, darker or lighter pigmentary change, recurrence, incomplete removal and infection.
Duration Of Freeze Thaw-Cycle (Seconds): 2

## 2021-04-26 ENCOUNTER — APPOINTMENT (RX ONLY)
Dept: URBAN - METROPOLITAN AREA CLINIC 4 | Facility: CLINIC | Age: 76
Setting detail: DERMATOLOGY
End: 2021-04-26

## 2021-04-26 PROBLEM — L80 VITILIGO: Status: ACTIVE | Noted: 2021-04-26

## 2021-04-26 PROCEDURE — ? EXCIMER LASER

## 2021-04-26 PROCEDURE — 96922 EXCIMER LSR PSRIASIS>500SQCM: CPT

## 2021-04-26 NOTE — PROCEDURE: EXCIMER LASER
Location #2: Around the mouth and armpits
Total Square Area In Cm2 (Optional): 1082
Consent: Written consent obtained, risks reviewed including but not limited to crusting, scabbing, blistering, scarring, darker or lighter pigmentary change, incidental hair removal, bruising, and/or incomplete removal.
Fluence #1 (J/Cm2 Or Mj/Cm2): 300
Fluence #2 (J/Cm2 Or Mj/Cm2): 100
Mode: repeat paint
Fluence Units: J/cm2
Spot Size: 2 x 2 cm
Post-Care Instructions: I reviewed with the patient in detail post-care instructions. Patient should stay away from the sun and wear sun protection until treated areas are fully healed.
Detail Level: Detailed
Location #1: Hands and fingers and upper chest/back
Billin (Total area greater than 500 cm2)
Treatment Number: 1

## 2021-04-28 ENCOUNTER — APPOINTMENT (RX ONLY)
Dept: URBAN - METROPOLITAN AREA CLINIC 4 | Facility: CLINIC | Age: 76
Setting detail: DERMATOLOGY
End: 2021-04-28

## 2021-04-28 PROBLEM — L80 VITILIGO: Status: ACTIVE | Noted: 2021-04-28

## 2021-04-28 PROCEDURE — ? EXCIMER LASER

## 2021-04-28 PROCEDURE — 96922 EXCIMER LSR PSRIASIS>500SQCM: CPT

## 2021-04-28 NOTE — PROCEDURE: EXCIMER LASER
Location #2: Around the mouth and armpits
Total Square Area In Cm2 (Optional): 2899
Consent: Written consent obtained, risks reviewed including but not limited to crusting, scabbing, blistering, scarring, darker or lighter pigmentary change, incidental hair removal, bruising, and/or incomplete removal.
Fluence #1 (J/Cm2 Or Mj/Cm2): 300
Fluence #2 (J/Cm2 Or Mj/Cm2): 100
Mode: repeat paint
Fluence #3 (J/Cm2 Or Mj/Cm2): not treated today
Fluence Units: J/cm2
Spot Size: 2 x 2 cm
Post-Care Instructions: I reviewed with the patient in detail post-care instructions. Patient should stay away from the sun and wear sun protection until treated areas are fully healed.
Detail Level: Detailed
Location #1: Hands and fingers
Billin (Total area greater than 500 cm2)
Treatment Number: 2
Location #3: Upper chest/back

## 2021-05-03 ENCOUNTER — APPOINTMENT (RX ONLY)
Dept: URBAN - METROPOLITAN AREA CLINIC 4 | Facility: CLINIC | Age: 76
Setting detail: DERMATOLOGY
End: 2021-05-03

## 2021-05-03 PROBLEM — L80 VITILIGO: Status: ACTIVE | Noted: 2021-05-03

## 2021-05-03 PROCEDURE — ? EXCIMER LASER

## 2021-05-03 PROCEDURE — 96922 EXCIMER LSR PSRIASIS>500SQCM: CPT

## 2021-05-03 NOTE — PROCEDURE: EXCIMER LASER
Location #2: Around the mouth and armpits
Total Square Area In Cm2 (Optional): 8758
Consent: Written consent obtained, risks reviewed including but not limited to crusting, scabbing, blistering, scarring, darker or lighter pigmentary change, incidental hair removal, bruising, and/or incomplete removal.
Fluence #1 (J/Cm2 Or Mj/Cm2): 300
Fluence #2 (J/Cm2 Or Mj/Cm2): 100
Mode: repeat paint
Fluence #3 (J/Cm2 Or Mj/Cm2): 250
Fluence Units: J/cm2
Spot Size: 2 x 2 cm
Post-Care Instructions: I reviewed with the patient in detail post-care instructions. Patient should stay away from the sun and wear sun protection until treated areas are fully healed.
Detail Level: Detailed
Location #1: Hands and fingers
Billin (Total area greater than 500 cm2)
Treatment Number: 4
Location #3: Upper chest/back

## 2021-05-05 ENCOUNTER — APPOINTMENT (RX ONLY)
Dept: URBAN - METROPOLITAN AREA CLINIC 22 | Facility: CLINIC | Age: 76
Setting detail: DERMATOLOGY
End: 2021-05-05

## 2021-05-05 ENCOUNTER — APPOINTMENT (RX ONLY)
Dept: URBAN - METROPOLITAN AREA CLINIC 4 | Facility: CLINIC | Age: 76
Setting detail: DERMATOLOGY
End: 2021-05-05

## 2021-05-05 DIAGNOSIS — Z41.9 ENCOUNTER FOR PROCEDURE FOR PURPOSES OTHER THAN REMEDYING HEALTH STATE, UNSPECIFIED: ICD-10-CM

## 2021-05-05 PROBLEM — L80 VITILIGO: Status: ACTIVE | Noted: 2021-05-05

## 2021-05-05 PROCEDURE — ? EXCIMER LASER

## 2021-05-05 PROCEDURE — ? COSMETIC FOLLOW-UP

## 2021-05-05 PROCEDURE — 96922 EXCIMER LSR PSRIASIS>500SQCM: CPT

## 2021-05-05 NOTE — PROCEDURE: COSMETIC FOLLOW-UP
Price (Use Numbers Only, No Special Characters Or $): 0.00
Treatment Override (Free Text): bbl
Detail Level: Zone
Global Improvement: Good
Patient Satisfaction: Pleased

## 2021-05-05 NOTE — PROCEDURE: EXCIMER LASER
Location #2: Around the mouth and armpits
Total Square Area In Cm2 (Optional): 1300
Consent: Written consent obtained, risks reviewed including but not limited to crusting, scabbing, blistering, scarring, darker or lighter pigmentary change, incidental hair removal, bruising, and/or incomplete removal.
Fluence #1 (J/Cm2 Or Mj/Cm2): 300
Fluence #2 (J/Cm2 Or Mj/Cm2): 100
Mode: repeat paint
Fluence #3 (J/Cm2 Or Mj/Cm2): 250
Fluence Units: J/cm2
Spot Size: 2 x 2 cm
Post-Care Instructions: I reviewed with the patient in detail post-care instructions. Patient should stay away from the sun and wear sun protection until treated areas are fully healed.
Detail Level: Detailed
Location #1: Hands and fingers
Billin (Total area greater than 500 cm2)
Treatment Number: 4
Location #3: Upper chest/back

## 2021-05-10 ENCOUNTER — APPOINTMENT (RX ONLY)
Dept: URBAN - METROPOLITAN AREA CLINIC 4 | Facility: CLINIC | Age: 76
Setting detail: DERMATOLOGY
End: 2021-05-10

## 2021-05-10 PROBLEM — L80 VITILIGO: Status: ACTIVE | Noted: 2021-05-10

## 2021-05-10 PROCEDURE — ? EXCIMER LASER

## 2021-05-10 PROCEDURE — 96922 EXCIMER LSR PSRIASIS>500SQCM: CPT

## 2021-05-10 NOTE — PROCEDURE: EXCIMER LASER
Location #2: Around the mouth and armpits
Total Square Area In Cm2 (Optional): 6312
Consent: Written consent obtained, risks reviewed including but not limited to crusting, scabbing, blistering, scarring, darker or lighter pigmentary change, incidental hair removal, bruising, and/or incomplete removal.
Fluence #1 (J/Cm2 Or Mj/Cm2): 300
Fluence #2 (J/Cm2 Or Mj/Cm2): 100
Mode: repeat paint
Fluence #3 (J/Cm2 Or Mj/Cm2): 250
Fluence Units: J/cm2
Spot Size: 2 x 2 cm
Post-Care Instructions: I reviewed with the patient in detail post-care instructions. Patient should stay away from the sun and wear sun protection until treated areas are fully healed.
Detail Level: Detailed
Location #1: Hands and fingers
Billin (Total area greater than 500 cm2)
Treatment Number: 5
Location #3: Upper chest/back

## 2021-05-12 ENCOUNTER — APPOINTMENT (RX ONLY)
Dept: URBAN - METROPOLITAN AREA CLINIC 22 | Facility: CLINIC | Age: 76
Setting detail: DERMATOLOGY
End: 2021-05-12

## 2021-05-12 DIAGNOSIS — Z41.9 ENCOUNTER FOR PROCEDURE FOR PURPOSES OTHER THAN REMEDYING HEALTH STATE, UNSPECIFIED: ICD-10-CM

## 2021-05-12 PROCEDURE — ? Q-SWITCHED LASER

## 2021-05-12 ASSESSMENT — LOCATION ZONE DERM: LOCATION ZONE: FACE

## 2021-05-12 ASSESSMENT — LOCATION DETAILED DESCRIPTION DERM
LOCATION DETAILED: LEFT CENTRAL MALAR CHEEK
LOCATION DETAILED: RIGHT CENTRAL MALAR CHEEK

## 2021-05-12 ASSESSMENT — LOCATION SIMPLE DESCRIPTION DERM
LOCATION SIMPLE: LEFT CHEEK
LOCATION SIMPLE: RIGHT CHEEK

## 2021-05-12 NOTE — PROCEDURE: Q-SWITCHED LASER
Consent: Written consent obtained, risks reviewed including but not limited to crusting, scabbing, blistering, scarring, darker or lighter pigmentary change, systemic reactions, ulceration, incidental hair removal, bruising, and/or incomplete removal.
Treatment Number: 1
Spot Size In Mm: 2
Post-Care Instructions: I reviewed with the patient in detail post-care instructions. Patient should avoid sunlight and wear sun protection.
Spot Size In Mm: 4
External Cooling Fan Speed: 0
Anesthesia Type: 1% lidocaine with epinephrine
Frequency In Hz: 3
Detail Level: Detailed
Endpoint: Immediate endpoint whitening and pinpoint bleeding. Vaseline and ice applied. Post care reviewed with patient.

## 2021-05-13 ENCOUNTER — APPOINTMENT (RX ONLY)
Dept: URBAN - METROPOLITAN AREA CLINIC 4 | Facility: CLINIC | Age: 76
Setting detail: DERMATOLOGY
End: 2021-05-13

## 2021-05-13 PROBLEM — L80 VITILIGO: Status: ACTIVE | Noted: 2021-05-13

## 2021-05-13 PROCEDURE — 96922 EXCIMER LSR PSRIASIS>500SQCM: CPT

## 2021-05-13 PROCEDURE — ? EXCIMER LASER

## 2021-05-13 NOTE — PROCEDURE: EXCIMER LASER
Location #2: Around the mouth and armpits
Total Square Area In Cm2 (Optional): 2632
Consent: Written consent obtained, risks reviewed including but not limited to crusting, scabbing, blistering, scarring, darker or lighter pigmentary change, incidental hair removal, bruising, and/or incomplete removal.
Fluence #1 (J/Cm2 Or Mj/Cm2): 300
Fluence #2 (J/Cm2 Or Mj/Cm2): 100
Mode: repeat paint
Fluence #3 (J/Cm2 Or Mj/Cm2): 200
Fluence Units: J/cm2
Spot Size: 2 x 2 cm
Post-Care Instructions: I reviewed with the patient in detail post-care instructions. Patient should stay away from the sun and wear sun protection until treated areas are fully healed.
Detail Level: Detailed
Location #1: Hands and fingers
Billin (Total area greater than 500 cm2)
Treatment Number: 6
Location #3: Upper chest/back

## 2021-05-17 ENCOUNTER — APPOINTMENT (RX ONLY)
Dept: URBAN - METROPOLITAN AREA CLINIC 4 | Facility: CLINIC | Age: 76
Setting detail: DERMATOLOGY
End: 2021-05-17

## 2021-05-17 PROBLEM — L80 VITILIGO: Status: ACTIVE | Noted: 2021-05-17

## 2021-05-17 PROCEDURE — ? EXCIMER LASER

## 2021-05-17 PROCEDURE — 96922 EXCIMER LSR PSRIASIS>500SQCM: CPT

## 2021-05-17 NOTE — PROCEDURE: EXCIMER LASER
Location #2: Around the mouth and armpits
Total Square Area In Cm2 (Optional): 5815
Consent: Written consent obtained, risks reviewed including but not limited to crusting, scabbing, blistering, scarring, darker or lighter pigmentary change, incidental hair removal, bruising, and/or incomplete removal.
Fluence #1 (J/Cm2 Or Mj/Cm2): 300
Fluence #2 (J/Cm2 Or Mj/Cm2): 100
Mode: repeat paint
Fluence #3 (J/Cm2 Or Mj/Cm2): 200
Fluence Units: J/cm2
Spot Size: 2 x 2 cm
Post-Care Instructions: I reviewed with the patient in detail post-care instructions. Patient should stay away from the sun and wear sun protection until treated areas are fully healed.
Detail Level: Detailed
Location #1: Hands and fingers
Billin (Total area greater than 500 cm2)
Treatment Number: 7
Location #3: Upper chest/back

## 2021-05-20 ENCOUNTER — APPOINTMENT (RX ONLY)
Dept: URBAN - METROPOLITAN AREA CLINIC 4 | Facility: CLINIC | Age: 76
Setting detail: DERMATOLOGY
End: 2021-05-20

## 2021-05-20 PROBLEM — L80 VITILIGO: Status: ACTIVE | Noted: 2021-05-20

## 2021-05-20 PROCEDURE — 96922 EXCIMER LSR PSRIASIS>500SQCM: CPT

## 2021-05-20 PROCEDURE — ? EXCIMER LASER

## 2021-05-20 NOTE — PROCEDURE: EXCIMER LASER
Location #2: Around the mouth and armpits
Total Square Area In Cm2 (Optional): 2621
Consent: Written consent obtained, risks reviewed including but not limited to crusting, scabbing, blistering, scarring, darker or lighter pigmentary change, incidental hair removal, bruising, and/or incomplete removal.
Fluence #1 (J/Cm2 Or Mj/Cm2): 300
Fluence #2 (J/Cm2 Or Mj/Cm2): 100
Mode: repeat paint
Fluence #3 (J/Cm2 Or Mj/Cm2): 200
Fluence Units: J/cm2
Spot Size: 2 x 2 cm
Post-Care Instructions: I reviewed with the patient in detail post-care instructions. Patient should stay away from the sun and wear sun protection until treated areas are fully healed.
Detail Level: Detailed
Location #1: Hands and fingers
Billin (Total area greater than 500 cm2)
Treatment Number: 8
Location #3: Upper chest/back

## 2021-05-24 ENCOUNTER — APPOINTMENT (RX ONLY)
Dept: URBAN - METROPOLITAN AREA CLINIC 4 | Facility: CLINIC | Age: 76
Setting detail: DERMATOLOGY
End: 2021-05-24

## 2021-05-24 PROBLEM — L80 VITILIGO: Status: ACTIVE | Noted: 2021-05-24

## 2021-05-24 PROCEDURE — ? EXCIMER LASER

## 2021-05-24 PROCEDURE — 96922 EXCIMER LSR PSRIASIS>500SQCM: CPT

## 2021-05-24 NOTE — PROCEDURE: EXCIMER LASER
Location #2: Around the mouth and armpits
Total Square Area In Cm2 (Optional): 1500
Consent: Written consent obtained, risks reviewed including but not limited to crusting, scabbing, blistering, scarring, darker or lighter pigmentary change, incidental hair removal, bruising, and/or incomplete removal.
Fluence #1 (J/Cm2 Or Mj/Cm2): 300
Fluence #2 (J/Cm2 Or Mj/Cm2): 100
Mode: repeat paint
Fluence #3 (J/Cm2 Or Mj/Cm2): 200
Fluence Units: J/cm2
Spot Size: 2 x 2 cm
Post-Care Instructions: I reviewed with the patient in detail post-care instructions. Patient should stay away from the sun and wear sun protection until treated areas are fully healed.
Detail Level: Detailed
Location #1: Hands and fingers
Billin (Total area greater than 500 cm2)
Treatment Number: 9
Location #3: Upper chest/back

## 2021-05-27 ENCOUNTER — APPOINTMENT (RX ONLY)
Dept: URBAN - METROPOLITAN AREA CLINIC 4 | Facility: CLINIC | Age: 76
Setting detail: DERMATOLOGY
End: 2021-05-27

## 2021-05-27 PROBLEM — L80 VITILIGO: Status: ACTIVE | Noted: 2021-05-27

## 2021-05-27 PROCEDURE — 96922 EXCIMER LSR PSRIASIS>500SQCM: CPT

## 2021-05-27 PROCEDURE — ? EXCIMER LASER

## 2021-05-27 NOTE — PROCEDURE: EXCIMER LASER
Location #2: Around the mouth and armpits
Total Square Area In Cm2 (Optional): 0087
Consent: Written consent obtained, risks reviewed including but not limited to crusting, scabbing, blistering, scarring, darker or lighter pigmentary change, incidental hair removal, bruising, and/or incomplete removal.
Fluence #1 (J/Cm2 Or Mj/Cm2): 350
Fluence #2 (J/Cm2 Or Mj/Cm2): 150
Mode: repeat paint
Fluence #3 (J/Cm2 Or Mj/Cm2): 200
Fluence Units: J/cm2
Spot Size: 2 x 2 cm
Post-Care Instructions: I reviewed with the patient in detail post-care instructions. Patient should stay away from the sun and wear sun protection until treated areas are fully healed.
Detail Level: Detailed
Location #1: Hands and fingers
Billin (Total area greater than 500 cm2)
Treatment Number: 10
Location #3: Upper chest/back

## 2021-06-02 ENCOUNTER — APPOINTMENT (RX ONLY)
Dept: URBAN - METROPOLITAN AREA CLINIC 4 | Facility: CLINIC | Age: 76
Setting detail: DERMATOLOGY
End: 2021-06-02

## 2021-06-02 PROBLEM — L80 VITILIGO: Status: ACTIVE | Noted: 2021-06-02

## 2021-06-02 PROCEDURE — ? EXCIMER LASER

## 2021-06-02 PROCEDURE — 96922 EXCIMER LSR PSRIASIS>500SQCM: CPT

## 2021-06-02 NOTE — PROCEDURE: EXCIMER LASER
Location #2: Around the mouth and armpits
Total Square Area In Cm2 (Optional): 0450
Consent: Written consent obtained, risks reviewed including but not limited to crusting, scabbing, blistering, scarring, darker or lighter pigmentary change, incidental hair removal, bruising, and/or incomplete removal.
Fluence #1 (J/Cm2 Or Mj/Cm2): 400
Fluence #2 (J/Cm2 Or Mj/Cm2): 200
Mode: repeat paint
Fluence #3 (J/Cm2 Or Mj/Cm2): 250
Fluence Units: J/cm2
Spot Size: 2 x 2 cm
Post-Care Instructions: I reviewed with the patient in detail post-care instructions. Patient should stay away from the sun and wear sun protection until treated areas are fully healed.
Detail Level: Detailed
Location #1: Hands and fingers
Billin (Total area greater than 500 cm2)
Treatment Number: 11
Location #3: Upper chest/back

## 2021-06-07 ENCOUNTER — APPOINTMENT (RX ONLY)
Dept: URBAN - METROPOLITAN AREA CLINIC 4 | Facility: CLINIC | Age: 76
Setting detail: DERMATOLOGY
End: 2021-06-07

## 2021-06-07 PROBLEM — L80 VITILIGO: Status: ACTIVE | Noted: 2021-06-07

## 2021-06-07 PROCEDURE — 96922 EXCIMER LSR PSRIASIS>500SQCM: CPT

## 2021-06-07 PROCEDURE — ? EXCIMER LASER

## 2021-06-07 NOTE — PROCEDURE: EXCIMER LASER
Location #2: Around the mouth and armpits
Total Square Area In Cm2 (Optional): 0047
Consent: Written consent obtained, risks reviewed including but not limited to crusting, scabbing, blistering, scarring, darker or lighter pigmentary change, incidental hair removal, bruising, and/or incomplete removal.
Fluence #1 (J/Cm2 Or Mj/Cm2): 450
Fluence #2 (J/Cm2 Or Mj/Cm2): 250
Mode: repeat paint
Fluence #3 (J/Cm2 Or Mj/Cm2): 300
Fluence Units: J/cm2
Spot Size: 2 x 2 cm
Post-Care Instructions: I reviewed with the patient in detail post-care instructions. Patient should stay away from the sun and wear sun protection until treated areas are fully healed.
Detail Level: Detailed
Location #1: Hands and fingers
Billin (Total area greater than 500 cm2)
Treatment Number: 12
Location #3: Upper chest/back

## 2021-06-17 ENCOUNTER — APPOINTMENT (RX ONLY)
Dept: URBAN - METROPOLITAN AREA CLINIC 4 | Facility: CLINIC | Age: 76
Setting detail: DERMATOLOGY
End: 2021-06-17

## 2021-06-17 PROBLEM — L80 VITILIGO: Status: ACTIVE | Noted: 2021-06-17

## 2021-06-17 PROCEDURE — 96922 EXCIMER LSR PSRIASIS>500SQCM: CPT

## 2021-06-17 PROCEDURE — ? EXCIMER LASER

## 2021-06-17 NOTE — PROCEDURE: EXCIMER LASER
Location #2: Around the mouth and armpits
Total Square Area In Cm2 (Optional): 0602
Consent: Written consent obtained, risks reviewed including but not limited to crusting, scabbing, blistering, scarring, darker or lighter pigmentary change, incidental hair removal, bruising, and/or incomplete removal.
Fluence #1 (J/Cm2 Or Mj/Cm2): 500
Fluence #2 (J/Cm2 Or Mj/Cm2): 300
Mode: repeat paint
Fluence #3 (J/Cm2 Or Mj/Cm2): 350
Fluence Units: J/cm2
Spot Size: 2 x 2 cm
Post-Care Instructions: I reviewed with the patient in detail post-care instructions. Patient should stay away from the sun and wear sun protection until treated areas are fully healed.
Detail Level: Detailed
Location #1: Hands and fingers
Billin (Total area greater than 500 cm2)
Treatment Number: 13
Location #3: Upper chest/back

## 2021-06-21 ENCOUNTER — APPOINTMENT (RX ONLY)
Dept: URBAN - METROPOLITAN AREA CLINIC 4 | Facility: CLINIC | Age: 76
Setting detail: DERMATOLOGY
End: 2021-06-21

## 2021-06-21 PROBLEM — L80 VITILIGO: Status: ACTIVE | Noted: 2021-06-21

## 2021-06-21 PROCEDURE — 96922 EXCIMER LSR PSRIASIS>500SQCM: CPT

## 2021-06-21 PROCEDURE — ? EXCIMER LASER

## 2021-06-21 NOTE — PROCEDURE: EXCIMER LASER
Location #2: Around the mouth and armpits
Total Square Area In Cm2 (Optional): 3109
Consent: Written consent obtained, risks reviewed including but not limited to crusting, scabbing, blistering, scarring, darker or lighter pigmentary change, incidental hair removal, bruising, and/or incomplete removal.
Fluence #1 (J/Cm2 Or Mj/Cm2): 550
Fluence #2 (J/Cm2 Or Mj/Cm2): 300
Mode: repeat paint
Fluence #3 (J/Cm2 Or Mj/Cm2): 350
Fluence Units: J/cm2
Spot Size: 2 x 2 cm
Post-Care Instructions: I reviewed with the patient in detail post-care instructions. Patient should stay away from the sun and wear sun protection until treated areas are fully healed.
Detail Level: Detailed
Location #1: Hands and fingers
Billin (Total area greater than 500 cm2)
Treatment Number: 14
Location #3: Upper chest/back

## 2021-06-24 ENCOUNTER — APPOINTMENT (RX ONLY)
Dept: URBAN - METROPOLITAN AREA CLINIC 4 | Facility: CLINIC | Age: 76
Setting detail: DERMATOLOGY
End: 2021-06-24

## 2021-06-24 PROBLEM — L80 VITILIGO: Status: ACTIVE | Noted: 2021-06-24

## 2021-06-24 PROCEDURE — ? EXCIMER LASER

## 2021-06-24 PROCEDURE — 96922 EXCIMER LSR PSRIASIS>500SQCM: CPT

## 2021-06-24 NOTE — PROCEDURE: EXCIMER LASER
Location #2: Around the mouth and armpits
Total Square Area In Cm2 (Optional): 900
Consent: Written consent obtained, risks reviewed including but not limited to crusting, scabbing, blistering, scarring, darker or lighter pigmentary change, incidental hair removal, bruising, and/or incomplete removal.
Fluence #1 (J/Cm2 Or Mj/Cm2): 550
Fluence #2 (J/Cm2 Or Mj/Cm2): 300
Mode: repeat paint
Fluence #3 (J/Cm2 Or Mj/Cm2): 350
Fluence Units: J/cm2
Spot Size: 2 x 2 cm
Post-Care Instructions: I reviewed with the patient in detail post-care instructions. Patient should stay away from the sun and wear sun protection until treated areas are fully healed.
Detail Level: Detailed
Location #1: Hands and fingers
Billin (Total area greater than 500 cm2)
Treatment Number: 15
Location #3: Upper chest/back

## 2021-06-28 ENCOUNTER — APPOINTMENT (RX ONLY)
Dept: URBAN - METROPOLITAN AREA CLINIC 4 | Facility: CLINIC | Age: 76
Setting detail: DERMATOLOGY
End: 2021-06-28

## 2021-06-28 PROBLEM — L80 VITILIGO: Status: ACTIVE | Noted: 2021-06-28

## 2021-06-28 PROCEDURE — 96922 EXCIMER LSR PSRIASIS>500SQCM: CPT

## 2021-06-28 PROCEDURE — ? EXCIMER LASER

## 2021-06-28 NOTE — PROCEDURE: EXCIMER LASER
Location #2: Around the mouth and armpits
Total Square Area In Cm2 (Optional): 1174
Consent: Written consent obtained, risks reviewed including but not limited to crusting, scabbing, blistering, scarring, darker or lighter pigmentary change, incidental hair removal, bruising, and/or incomplete removal.
Fluence #1 (J/Cm2 Or Mj/Cm2): 600
Fluence #2 (J/Cm2 Or Mj/Cm2): 300
Mode: repeat paint
Fluence #3 (J/Cm2 Or Mj/Cm2): 350
Fluence Units: J/cm2
Spot Size: 2 x 2 cm
Post-Care Instructions: I reviewed with the patient in detail post-care instructions. Patient should stay away from the sun and wear sun protection until treated areas are fully healed.
Detail Level: Detailed
Location #1: Hands and fingers
Billin (Total area greater than 500 cm2)
Treatment Number: 16
Location #3: Upper chest/back

## 2021-07-01 ENCOUNTER — APPOINTMENT (RX ONLY)
Dept: URBAN - METROPOLITAN AREA CLINIC 4 | Facility: CLINIC | Age: 76
Setting detail: DERMATOLOGY
End: 2021-07-01

## 2021-07-01 PROBLEM — L80 VITILIGO: Status: ACTIVE | Noted: 2021-07-01

## 2021-07-01 PROCEDURE — ? EXCIMER LASER

## 2021-07-01 PROCEDURE — 96922 EXCIMER LSR PSRIASIS>500SQCM: CPT

## 2021-07-01 NOTE — PROCEDURE: EXCIMER LASER
Location #2: Around the mouth and armpits
Total Square Area In Cm2 (Optional): 940
Consent: Written consent obtained, risks reviewed including but not limited to crusting, scabbing, blistering, scarring, darker or lighter pigmentary change, incidental hair removal, bruising, and/or incomplete removal.
Fluence #1 (J/Cm2 Or Mj/Cm2): 600
Fluence #2 (J/Cm2 Or Mj/Cm2): 350
Mode: repeat paint
Fluence Units: J/cm2
Spot Size: 2 x 2 cm
Post-Care Instructions: I reviewed with the patient in detail post-care instructions. Patient should stay away from the sun and wear sun protection until treated areas are fully healed.
Detail Level: Detailed
Location #1: Hands and fingers
Billin (Total area greater than 500 cm2)
Treatment Number: 17
Location #3: Upper chest/back

## 2021-07-06 ENCOUNTER — APPOINTMENT (RX ONLY)
Dept: URBAN - METROPOLITAN AREA CLINIC 4 | Facility: CLINIC | Age: 76
Setting detail: DERMATOLOGY
End: 2021-07-06

## 2021-07-06 PROBLEM — L80 VITILIGO: Status: ACTIVE | Noted: 2021-07-06

## 2021-07-06 PROCEDURE — ? EXCIMER LASER

## 2021-07-06 PROCEDURE — 96922 EXCIMER LSR PSRIASIS>500SQCM: CPT

## 2021-07-06 NOTE — PROCEDURE: EXCIMER LASER
Location #2: Around the mouth and armpits
Total Square Area In Cm2 (Optional): 891
Consent: Written consent obtained, risks reviewed including but not limited to crusting, scabbing, blistering, scarring, darker or lighter pigmentary change, incidental hair removal, bruising, and/or incomplete removal.
Fluence #1 (J/Cm2 Or Mj/Cm2): 600
Fluence #2 (J/Cm2 Or Mj/Cm2): 350
Mode: repeat paint
Fluence Units: J/cm2
Spot Size: 2 x 2 cm
Post-Care Instructions: I reviewed with the patient in detail post-care instructions. Patient should stay away from the sun and wear sun protection until treated areas are fully healed.
Detail Level: Detailed
Location #1: Hands and fingers
Billin (Total area greater than 500 cm2)
Treatment Number: 18
Location #3: Upper chest/back

## 2021-07-09 ENCOUNTER — APPOINTMENT (RX ONLY)
Dept: URBAN - METROPOLITAN AREA CLINIC 4 | Facility: CLINIC | Age: 76
Setting detail: DERMATOLOGY
End: 2021-07-09

## 2021-07-09 PROBLEM — L80 VITILIGO: Status: ACTIVE | Noted: 2021-07-09

## 2021-07-09 PROCEDURE — 96922 EXCIMER LSR PSRIASIS>500SQCM: CPT

## 2021-07-09 PROCEDURE — ? EXCIMER LASER

## 2021-07-09 NOTE — PROCEDURE: EXCIMER LASER
Location #2: Around the mouth and armpits
Total Square Area In Cm2 (Optional): 7805
Consent: Written consent obtained, risks reviewed including but not limited to crusting, scabbing, blistering, scarring, darker or lighter pigmentary change, incidental hair removal, bruising, and/or incomplete removal.
Fluence #1 (J/Cm2 Or Mj/Cm2): 600
Fluence #2 (J/Cm2 Or Mj/Cm2): 350
Mode: repeat paint
Fluence Units: J/cm2
Spot Size: 2 x 2 cm
Post-Care Instructions: I reviewed with the patient in detail post-care instructions. Patient should stay away from the sun and wear sun protection until treated areas are fully healed.
Detail Level: Detailed
Location #1: Hands and fingers
Billin (Total area greater than 500 cm2)
Treatment Number: 19
Location #3: Upper chest/back

## 2021-07-12 ENCOUNTER — APPOINTMENT (RX ONLY)
Dept: URBAN - METROPOLITAN AREA CLINIC 4 | Facility: CLINIC | Age: 76
Setting detail: DERMATOLOGY
End: 2021-07-12

## 2021-07-12 PROBLEM — L80 VITILIGO: Status: ACTIVE | Noted: 2021-07-12

## 2021-07-12 PROCEDURE — ? EXCIMER LASER

## 2021-07-12 PROCEDURE — 96922 EXCIMER LSR PSRIASIS>500SQCM: CPT

## 2021-07-12 NOTE — PROCEDURE: EXCIMER LASER
Location #2: Around the mouth and armpits
Total Square Area In Cm2 (Optional): 3836
Consent: Written consent obtained, risks reviewed including but not limited to crusting, scabbing, blistering, scarring, darker or lighter pigmentary change, incidental hair removal, bruising, and/or incomplete removal.
Fluence #1 (J/Cm2 Or Mj/Cm2): 600
Fluence #2 (J/Cm2 Or Mj/Cm2): 350
Mode: repeat paint
Fluence Units: J/cm2
Spot Size: 2 x 2 cm
Post-Care Instructions: I reviewed with the patient in detail post-care instructions. Patient should stay away from the sun and wear sun protection until treated areas are fully healed.
Detail Level: Detailed
Location #1: Hands and fingers
Billin (Total area greater than 500 cm2)
Treatment Number: 20
Location #3: Upper chest/back

## 2021-07-15 ENCOUNTER — APPOINTMENT (RX ONLY)
Dept: URBAN - METROPOLITAN AREA CLINIC 4 | Facility: CLINIC | Age: 76
Setting detail: DERMATOLOGY
End: 2021-07-15

## 2021-07-15 PROBLEM — L80 VITILIGO: Status: ACTIVE | Noted: 2021-07-15

## 2021-07-15 PROCEDURE — 96922 EXCIMER LSR PSRIASIS>500SQCM: CPT

## 2021-07-15 PROCEDURE — ? EXCIMER LASER

## 2021-07-15 NOTE — PROCEDURE: EXCIMER LASER
Location #2: Around the mouth and armpits
Total Square Area In Cm2 (Optional): 9729
Consent: Written consent obtained, risks reviewed including but not limited to crusting, scabbing, blistering, scarring, darker or lighter pigmentary change, incidental hair removal, bruising, and/or incomplete removal.
Fluence #1 (J/Cm2 Or Mj/Cm2): 600
Fluence #2 (J/Cm2 Or Mj/Cm2): 350
Mode: tile
Fluence Units: J/cm2
Spot Size: 2 x 2 cm
Post-Care Instructions: I reviewed with the patient in detail post-care instructions. Patient should stay away from the sun and wear sun protection until treated areas are fully healed.
Detail Level: Detailed
Location #1: Hands and fingers
Billin (Total area greater than 500 cm2)
Treatment Number: 21
Location #3: Upper chest/back

## 2021-07-19 ENCOUNTER — APPOINTMENT (RX ONLY)
Dept: URBAN - METROPOLITAN AREA CLINIC 4 | Facility: CLINIC | Age: 76
Setting detail: DERMATOLOGY
End: 2021-07-19

## 2021-07-19 PROBLEM — L80 VITILIGO: Status: ACTIVE | Noted: 2021-07-19

## 2021-07-19 PROCEDURE — 96922 EXCIMER LSR PSRIASIS>500SQCM: CPT

## 2021-07-19 PROCEDURE — ? EXCIMER LASER

## 2021-07-19 NOTE — PROCEDURE: EXCIMER LASER
Location #2: Around the mouth and armpits
Total Square Area In Cm2 (Optional): 942
Consent: Written consent obtained, risks reviewed including but not limited to crusting, scabbing, blistering, scarring, darker or lighter pigmentary change, incidental hair removal, bruising, and/or incomplete removal.
Fluence #1 (J/Cm2 Or Mj/Cm2): 600
Fluence #2 (J/Cm2 Or Mj/Cm2): 350
Mode: tile
Fluence Units: J/cm2
Spot Size: 2 x 2 cm
Post-Care Instructions: I reviewed with the patient in detail post-care instructions. Patient should stay away from the sun and wear sun protection until treated areas are fully healed.
Detail Level: Detailed
Location #1: Hands and fingers
Billin (Total area greater than 500 cm2)
Treatment Number: 22
Location #3: Upper chest/back

## 2021-07-23 ENCOUNTER — APPOINTMENT (RX ONLY)
Dept: URBAN - METROPOLITAN AREA CLINIC 4 | Facility: CLINIC | Age: 76
Setting detail: DERMATOLOGY
End: 2021-07-23

## 2021-07-23 PROBLEM — L80 VITILIGO: Status: ACTIVE | Noted: 2021-07-23

## 2021-07-23 PROCEDURE — 96922 EXCIMER LSR PSRIASIS>500SQCM: CPT

## 2021-07-23 PROCEDURE — ? EXCIMER LASER

## 2021-07-23 NOTE — PROCEDURE: EXCIMER LASER
Location #2: Around the mouth and armpits
Total Square Area In Cm2 (Optional): 1148
Consent: Written consent obtained, risks reviewed including but not limited to crusting, scabbing, blistering, scarring, darker or lighter pigmentary change, incidental hair removal, bruising, and/or incomplete removal.
Fluence #1 (J/Cm2 Or Mj/Cm2): 600
Fluence #2 (J/Cm2 Or Mj/Cm2): 350
Mode: tile
Fluence Units: J/cm2
Spot Size: 2 x 2 cm
Post-Care Instructions: I reviewed with the patient in detail post-care instructions. Patient should stay away from the sun and wear sun protection until treated areas are fully healed.
Detail Level: Detailed
Location #1: Hands and fingers
Billin (Total area greater than 500 cm2)
Treatment Number: 23
Location #3: Upper chest/back

## 2021-07-26 ENCOUNTER — APPOINTMENT (RX ONLY)
Dept: URBAN - METROPOLITAN AREA CLINIC 4 | Facility: CLINIC | Age: 76
Setting detail: DERMATOLOGY
End: 2021-07-26

## 2021-07-26 PROBLEM — L80 VITILIGO: Status: ACTIVE | Noted: 2021-07-26

## 2021-07-26 PROCEDURE — ? EXCIMER LASER

## 2021-07-26 PROCEDURE — 96922 EXCIMER LSR PSRIASIS>500SQCM: CPT

## 2021-07-26 NOTE — PROCEDURE: EXCIMER LASER
Location #2: Around the mouth and armpits
Total Square Area In Cm2 (Optional): 3472
Consent: Written consent obtained, risks reviewed including but not limited to crusting, scabbing, blistering, scarring, darker or lighter pigmentary change, incidental hair removal, bruising, and/or incomplete removal.
Fluence #1 (J/Cm2 Or Mj/Cm2): 650
Fluence #2 (J/Cm2 Or Mj/Cm2): 350
Mode: tile
Fluence Units: J/cm2
Spot Size: 2 x 2 cm
Post-Care Instructions: I reviewed with the patient in detail post-care instructions. Patient should stay away from the sun and wear sun protection until treated areas are fully healed.
Detail Level: Detailed
Location #1: Hands and fingers
Billin (Total area greater than 500 cm2)
Treatment Number: 24
Location #3: Upper chest/back

## 2021-07-29 ENCOUNTER — APPOINTMENT (RX ONLY)
Dept: URBAN - METROPOLITAN AREA CLINIC 4 | Facility: CLINIC | Age: 76
Setting detail: DERMATOLOGY
End: 2021-07-29

## 2021-07-29 PROBLEM — L80 VITILIGO: Status: ACTIVE | Noted: 2021-07-29

## 2021-07-29 PROCEDURE — 96922 EXCIMER LSR PSRIASIS>500SQCM: CPT

## 2021-07-29 PROCEDURE — ? EXCIMER LASER

## 2021-07-29 NOTE — PROCEDURE: EXCIMER LASER
Location #2: Around the mouth and armpits
Total Square Area In Cm2 (Optional): 3563
Consent: Written consent obtained, risks reviewed including but not limited to crusting, scabbing, blistering, scarring, darker or lighter pigmentary change, incidental hair removal, bruising, and/or incomplete removal.
Fluence #1 (J/Cm2 Or Mj/Cm2): 650
Fluence #2 (J/Cm2 Or Mj/Cm2): 350
Mode: tile
Fluence Units: J/cm2
Spot Size: 2 x 2 cm
Post-Care Instructions: I reviewed with the patient in detail post-care instructions. Patient should stay away from the sun and wear sun protection until treated areas are fully healed.
Detail Level: Detailed
Location #1: Hands and fingers
Billin (Total area greater than 500 cm2)
Treatment Number: 25
Location #3: Upper chest/back

## 2021-08-02 ENCOUNTER — APPOINTMENT (RX ONLY)
Dept: URBAN - METROPOLITAN AREA CLINIC 4 | Facility: CLINIC | Age: 76
Setting detail: DERMATOLOGY
End: 2021-08-02

## 2021-08-02 PROBLEM — L80 VITILIGO: Status: ACTIVE | Noted: 2021-08-02

## 2021-08-02 PROCEDURE — ? EXCIMER LASER

## 2021-08-02 PROCEDURE — 96922 EXCIMER LSR PSRIASIS>500SQCM: CPT

## 2021-08-02 NOTE — PROCEDURE: EXCIMER LASER
Location #2: Around the mouth and armpits
Total Square Area In Cm2 (Optional): 1016
Consent: Written consent obtained, risks reviewed including but not limited to crusting, scabbing, blistering, scarring, darker or lighter pigmentary change, incidental hair removal, bruising, and/or incomplete removal.
Fluence #1 (J/Cm2 Or Mj/Cm2): 700
Fluence #2 (J/Cm2 Or Mj/Cm2): 350
Mode: tile
Fluence Units: J/cm2
Spot Size: 2 x 2 cm
Post-Care Instructions: I reviewed with the patient in detail post-care instructions. Patient should stay away from the sun and wear sun protection until treated areas are fully healed.
Detail Level: Detailed
Location #1: Hands and fingers
Billin (Total area greater than 500 cm2)
Treatment Number: 26
Location #3: Upper chest/back

## 2021-08-05 ENCOUNTER — APPOINTMENT (RX ONLY)
Dept: URBAN - METROPOLITAN AREA CLINIC 4 | Facility: CLINIC | Age: 76
Setting detail: DERMATOLOGY
End: 2021-08-05

## 2021-08-05 PROBLEM — L80 VITILIGO: Status: ACTIVE | Noted: 2021-08-05

## 2021-08-05 PROCEDURE — 96922 EXCIMER LSR PSRIASIS>500SQCM: CPT

## 2021-08-05 PROCEDURE — ? EXCIMER LASER

## 2021-08-05 NOTE — PROCEDURE: EXCIMER LASER
Location #2: Around the mouth and armpits
Total Square Area In Cm2 (Optional): 8163
Consent: Written consent obtained, risks reviewed including but not limited to crusting, scabbing, blistering, scarring, darker or lighter pigmentary change, incidental hair removal, bruising, and/or incomplete removal.
Fluence #1 (J/Cm2 Or Mj/Cm2): 750
Fluence #2 (J/Cm2 Or Mj/Cm2): 400
Mode: tile
Fluence Units: J/cm2
Spot Size: 2 x 2 cm
Post-Care Instructions: I reviewed with the patient in detail post-care instructions. Patient should stay away from the sun and wear sun protection until treated areas are fully healed.
Detail Level: Detailed
Location #1: Hands and fingers
Billin (Total area greater than 500 cm2)
Treatment Number: 27
Location #3: Upper chest/back

## 2021-08-09 ENCOUNTER — APPOINTMENT (RX ONLY)
Dept: URBAN - METROPOLITAN AREA CLINIC 4 | Facility: CLINIC | Age: 76
Setting detail: DERMATOLOGY
End: 2021-08-09

## 2021-08-09 PROBLEM — L80 VITILIGO: Status: ACTIVE | Noted: 2021-08-09

## 2021-08-09 PROCEDURE — ? EXCIMER LASER

## 2021-08-09 PROCEDURE — 96922 EXCIMER LSR PSRIASIS>500SQCM: CPT

## 2021-08-09 PROCEDURE — ? ADDITIONAL NOTES

## 2021-08-09 NOTE — PROCEDURE: ADDITIONAL NOTES
Detail Level: Generalized
Render Risk Assessment In Note?: no
Additional Notes: Pt reports some redness and itching but no pain. \\n\\nPhotos taken. Consulted with Dr. Grant. Decreased strength and avoided treatment to blotchy areas. Instructed pt that if she gets any pinker than she is now on chest and armpits, she is to skip next treatment to those areas.

## 2021-08-09 NOTE — PROCEDURE: EXCIMER LASER
Location #2: Around the mouth and armpits
Total Square Area In Cm2 (Optional): 940
Consent: Written consent obtained, risks reviewed including but not limited to crusting, scabbing, blistering, scarring, darker or lighter pigmentary change, incidental hair removal, bruising, and/or incomplete removal.
Fluence #1 (J/Cm2 Or Mj/Cm2): 700
Fluence #2 (J/Cm2 Or Mj/Cm2): 350
Mode: tile
Fluence Units: J/cm2
Spot Size: 2 x 2 cm
Post-Care Instructions: I reviewed with the patient in detail post-care instructions. Patient should stay away from the sun and wear sun protection until treated areas are fully healed.
Detail Level: Detailed
Location #1: Hands and fingers
Billin (Total area greater than 500 cm2)
Treatment Number: 28
Location #3: Upper chest/back

## 2021-08-12 ENCOUNTER — APPOINTMENT (RX ONLY)
Dept: URBAN - METROPOLITAN AREA CLINIC 4 | Facility: CLINIC | Age: 76
Setting detail: DERMATOLOGY
End: 2021-08-12

## 2021-08-12 PROBLEM — L80 VITILIGO: Status: ACTIVE | Noted: 2021-08-12

## 2021-08-12 PROCEDURE — 96922 EXCIMER LSR PSRIASIS>500SQCM: CPT

## 2021-08-12 PROCEDURE — ? EXCIMER LASER

## 2021-08-12 PROCEDURE — ? ADDITIONAL NOTES

## 2021-08-12 NOTE — PROCEDURE: EXCIMER LASER
Location #2: Around the mouth and armpits
Total Square Area In Cm2 (Optional): 994
Consent: Written consent obtained, risks reviewed including but not limited to crusting, scabbing, blistering, scarring, darker or lighter pigmentary change, incidental hair removal, bruising, and/or incomplete removal.
Fluence #1 (J/Cm2 Or Mj/Cm2): 700
Fluence #2 (J/Cm2 Or Mj/Cm2): 350
Mode: tile
Fluence Units: J/cm2
Spot Size: 2 x 2 cm
Post-Care Instructions: I reviewed with the patient in detail post-care instructions. Patient should stay away from the sun and wear sun protection until treated areas are fully healed.
Detail Level: Detailed
Location #1: Hands and fingers
Billin (Total area greater than 500 cm2)
Treatment Number: 29
Location #3: Upper chest/back

## 2021-08-21 ENCOUNTER — APPOINTMENT (OUTPATIENT)
Dept: RADIOLOGY | Facility: MEDICAL CENTER | Age: 76
End: 2021-08-21
Attending: EMERGENCY MEDICINE
Payer: MEDICARE

## 2021-08-21 ENCOUNTER — HOSPITAL ENCOUNTER (EMERGENCY)
Facility: MEDICAL CENTER | Age: 76
End: 2021-08-21
Attending: EMERGENCY MEDICINE
Payer: MEDICARE

## 2021-08-21 VITALS
HEART RATE: 78 BPM | RESPIRATION RATE: 18 BRPM | TEMPERATURE: 98 F | BODY MASS INDEX: 21.34 KG/M2 | DIASTOLIC BLOOD PRESSURE: 63 MMHG | SYSTOLIC BLOOD PRESSURE: 154 MMHG | WEIGHT: 125 LBS | HEIGHT: 64 IN | OXYGEN SATURATION: 95 %

## 2021-08-21 DIAGNOSIS — R10.10 PAIN OF UPPER ABDOMEN: ICD-10-CM

## 2021-08-21 DIAGNOSIS — R11.2 NON-INTRACTABLE VOMITING WITH NAUSEA, UNSPECIFIED VOMITING TYPE: ICD-10-CM

## 2021-08-21 DIAGNOSIS — U07.1 COVID-19 VIRUS INFECTION: ICD-10-CM

## 2021-08-21 DIAGNOSIS — R19.7 DIARRHEA, UNSPECIFIED TYPE: ICD-10-CM

## 2021-08-21 DIAGNOSIS — S51.851A CAT BITE OF FOREARM, RIGHT, INITIAL ENCOUNTER: ICD-10-CM

## 2021-08-21 DIAGNOSIS — T50.905A ADVERSE EFFECT OF DRUG, INITIAL ENCOUNTER: ICD-10-CM
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
## 2021-08-21 LAB
ALBUMIN SERPL BCP-MCNC: 3.7 G/DL (ref 3.2–4.9)
ALBUMIN/GLOB SERPL: 1.1 G/DL
ALP SERPL-CCNC: 68 U/L (ref 30–99)
ALT SERPL-CCNC: 43 U/L (ref 2–50)
ANION GAP SERPL CALC-SCNC: 14 MMOL/L (ref 7–16)
APPEARANCE UR: CLEAR
AST SERPL-CCNC: 51 U/L (ref 12–45)
BASOPHILS # BLD AUTO: 0.1 % (ref 0–1.8)
BASOPHILS # BLD: 0.01 K/UL (ref 0–0.12)
BILIRUB SERPL-MCNC: 0.4 MG/DL (ref 0.1–1.5)
BILIRUB UR QL STRIP.AUTO: NEGATIVE
BUN SERPL-MCNC: 15 MG/DL (ref 8–22)
CALCIUM SERPL-MCNC: 9.2 MG/DL (ref 8.4–10.2)
CHLORIDE SERPL-SCNC: 104 MMOL/L (ref 96–112)
CO2 SERPL-SCNC: 22 MMOL/L (ref 20–33)
COLOR UR: YELLOW
CREAT SERPL-MCNC: 0.63 MG/DL (ref 0.5–1.4)
EOSINOPHIL # BLD AUTO: 0.11 K/UL (ref 0–0.51)
EOSINOPHIL NFR BLD: 1 % (ref 0–6.9)
ERYTHROCYTE [DISTWIDTH] IN BLOOD BY AUTOMATED COUNT: 49.6 FL (ref 35.9–50)
FLUAV RNA SPEC QL NAA+PROBE: NEGATIVE
FLUBV RNA SPEC QL NAA+PROBE: NEGATIVE
GLOBULIN SER CALC-MCNC: 3.4 G/DL (ref 1.9–3.5)
GLUCOSE SERPL-MCNC: 128 MG/DL (ref 65–99)
GLUCOSE UR STRIP.AUTO-MCNC: NEGATIVE MG/DL
HCT VFR BLD AUTO: 42.8 % (ref 37–47)
HGB BLD-MCNC: 14.1 G/DL (ref 12–16)
IMM GRANULOCYTES # BLD AUTO: 0.06 K/UL (ref 0–0.11)
IMM GRANULOCYTES NFR BLD AUTO: 0.6 % (ref 0–0.9)
KETONES UR STRIP.AUTO-MCNC: 15 MG/DL
LEUKOCYTE ESTERASE UR QL STRIP.AUTO: NEGATIVE
LIPASE SERPL-CCNC: 47 U/L (ref 7–58)
LYMPHOCYTES # BLD AUTO: 0.9 K/UL (ref 1–4.8)
LYMPHOCYTES NFR BLD: 8.4 % (ref 22–41)
MCH RBC QN AUTO: 32.1 PG (ref 27–33)
MCHC RBC AUTO-ENTMCNC: 32.9 G/DL (ref 33.6–35)
MCV RBC AUTO: 97.5 FL (ref 81.4–97.8)
MICRO URNS: ABNORMAL
MONOCYTES # BLD AUTO: 0.84 K/UL (ref 0–0.85)
MONOCYTES NFR BLD AUTO: 7.8 % (ref 0–13.4)
NEUTROPHILS # BLD AUTO: 8.8 K/UL (ref 2–7.15)
NEUTROPHILS NFR BLD: 82.1 % (ref 44–72)
NITRITE UR QL STRIP.AUTO: NEGATIVE
NRBC # BLD AUTO: 0 K/UL
NRBC BLD-RTO: 0 /100 WBC
PH UR STRIP.AUTO: 6.5 [PH] (ref 5–8)
PLATELET # BLD AUTO: 221 K/UL (ref 164–446)
PMV BLD AUTO: 10.5 FL (ref 9–12.9)
POTASSIUM SERPL-SCNC: 4.1 MMOL/L (ref 3.6–5.5)
PROT SERPL-MCNC: 7.1 G/DL (ref 6–8.2)
PROT UR QL STRIP: NEGATIVE MG/DL
RBC # BLD AUTO: 4.39 M/UL (ref 4.2–5.4)
RBC UR QL AUTO: NEGATIVE
RSV RNA SPEC QL NAA+PROBE: NEGATIVE
SARS-COV-2 RNA RESP QL NAA+PROBE: DETECTED
SODIUM SERPL-SCNC: 140 MMOL/L (ref 135–145)
SP GR UR STRIP.AUTO: 1.01
SPECIMEN SOURCE: ABNORMAL
WBC # BLD AUTO: 10.7 K/UL (ref 4.8–10.8)

## 2021-08-21 PROCEDURE — 74177 CT ABD & PELVIS W/CONTRAST: CPT | Mod: ME

## 2021-08-21 PROCEDURE — 81003 URINALYSIS AUTO W/O SCOPE: CPT

## 2021-08-21 PROCEDURE — 99285 EMERGENCY DEPT VISIT HI MDM: CPT

## 2021-08-21 PROCEDURE — C9803 HOPD COVID-19 SPEC COLLECT: HCPCS | Performed by: EMERGENCY MEDICINE

## 2021-08-21 PROCEDURE — A9270 NON-COVERED ITEM OR SERVICE: HCPCS | Performed by: EMERGENCY MEDICINE

## 2021-08-21 PROCEDURE — 85025 COMPLETE CBC W/AUTO DIFF WBC: CPT

## 2021-08-21 PROCEDURE — 96365 THER/PROPH/DIAG IV INF INIT: CPT

## 2021-08-21 PROCEDURE — 0241U HCHG SARS-COV-2 COVID-19 NFCT DS RESP RNA 4 TRGT MIC: CPT

## 2021-08-21 PROCEDURE — 83690 ASSAY OF LIPASE: CPT

## 2021-08-21 PROCEDURE — 700102 HCHG RX REV CODE 250 W/ 637 OVERRIDE(OP): Performed by: EMERGENCY MEDICINE

## 2021-08-21 PROCEDURE — 700117 HCHG RX CONTRAST REV CODE 255: Performed by: EMERGENCY MEDICINE

## 2021-08-21 PROCEDURE — 96375 TX/PRO/DX INJ NEW DRUG ADDON: CPT

## 2021-08-21 PROCEDURE — 700105 HCHG RX REV CODE 258: Performed by: EMERGENCY MEDICINE

## 2021-08-21 PROCEDURE — 96376 TX/PRO/DX INJ SAME DRUG ADON: CPT

## 2021-08-21 PROCEDURE — 700111 HCHG RX REV CODE 636 W/ 250 OVERRIDE (IP): Performed by: EMERGENCY MEDICINE

## 2021-08-21 PROCEDURE — 80053 COMPREHEN METABOLIC PANEL: CPT

## 2021-08-21 PROCEDURE — 36415 COLL VENOUS BLD VENIPUNCTURE: CPT

## 2021-08-21 RX ORDER — ONDANSETRON 2 MG/ML
4 INJECTION INTRAMUSCULAR; INTRAVENOUS ONCE
Status: COMPLETED | OUTPATIENT
Start: 2021-08-21 | End: 2021-08-21

## 2021-08-21 RX ORDER — DOXYCYCLINE 100 MG/1
100 CAPSULE ORAL 2 TIMES DAILY
Qty: 14 CAPSULE | Refills: 0 | Status: SHIPPED | OUTPATIENT
Start: 2021-08-21 | End: 2021-08-28

## 2021-08-21 RX ORDER — CEFTRIAXONE 500 MG/1
250 INJECTION, POWDER, FOR SOLUTION INTRAMUSCULAR; INTRAVENOUS ONCE
Status: DISCONTINUED | OUTPATIENT
Start: 2021-08-21 | End: 2021-08-21

## 2021-08-21 RX ORDER — MORPHINE SULFATE 4 MG/ML
4 INJECTION, SOLUTION INTRAMUSCULAR; INTRAVENOUS ONCE
Status: DISCONTINUED | OUTPATIENT
Start: 2021-08-21 | End: 2021-08-22 | Stop reason: HOSPADM

## 2021-08-21 RX ORDER — CEFUROXIME AXETIL 500 MG/1
500 TABLET ORAL 2 TIMES DAILY
Qty: 14 TABLET | Refills: 0 | Status: SHIPPED | OUTPATIENT
Start: 2021-08-21 | End: 2021-08-28

## 2021-08-21 RX ORDER — SODIUM CHLORIDE, SODIUM LACTATE, POTASSIUM CHLORIDE, CALCIUM CHLORIDE 600; 310; 30; 20 MG/100ML; MG/100ML; MG/100ML; MG/100ML
1000 INJECTION, SOLUTION INTRAVENOUS ONCE
Status: COMPLETED | OUTPATIENT
Start: 2021-08-21 | End: 2021-08-21

## 2021-08-21 RX ORDER — ONDANSETRON 4 MG/1
4 TABLET, ORALLY DISINTEGRATING ORAL EVERY 6 HOURS PRN
Qty: 10 TABLET | Refills: 0 | Status: SHIPPED | OUTPATIENT
Start: 2021-08-21

## 2021-08-21 RX ORDER — DOXYCYCLINE 100 MG/1
100 TABLET ORAL ONCE
Status: DISCONTINUED | OUTPATIENT
Start: 2021-08-21 | End: 2021-08-22 | Stop reason: HOSPADM

## 2021-08-21 RX ADMIN — ONDANSETRON 4 MG: 2 INJECTION INTRAMUSCULAR; INTRAVENOUS at 20:37

## 2021-08-21 RX ADMIN — IOHEXOL 100 ML: 350 INJECTION, SOLUTION INTRAVENOUS at 19:14

## 2021-08-21 RX ADMIN — LIDOCAINE HYDROCHLORIDE 30 ML: 20 SOLUTION OROPHARYNGEAL at 20:46

## 2021-08-21 RX ADMIN — ONDANSETRON 4 MG: 2 INJECTION INTRAMUSCULAR; INTRAVENOUS at 19:22

## 2021-08-21 RX ADMIN — SODIUM CHLORIDE, POTASSIUM CHLORIDE, SODIUM LACTATE AND CALCIUM CHLORIDE 1000 ML: 600; 310; 30; 20 INJECTION, SOLUTION INTRAVENOUS at 19:22

## 2021-08-21 RX ADMIN — CEFTRIAXONE SODIUM 1 G: 1 INJECTION, POWDER, FOR SOLUTION INTRAMUSCULAR; INTRAVENOUS at 20:41

## 2021-08-21 ASSESSMENT — FIBROSIS 4 INDEX: FIB4 SCORE: 2.74

## 2021-08-21 NOTE — Clinical Note
Pt. Verbalizes understanding of discharge instructions. Ambulates to lobby with steady gait. Pt. Alert/awake in NAD. Pt. family to drive pt. Home per patient.

## 2021-08-22 NOTE — ED NOTES
Pt requesting to speak with ERP for questions about abx.   Also c/o continued nausea and upset stomach feeling.  ERP aware states  He will be there when he can.

## 2021-08-22 NOTE — ED TRIAGE NOTES
Biba for abdominal pain, vomiting, diarrhea one hour after taking first augmentin dose for cat bite.

## 2021-08-22 NOTE — ED PROVIDER NOTES
ED Provider Note    CHIEF COMPLAINT  Chief Complaint   Patient presents with   • Abdominal Pain     BIBA for abdominal pain, vomiting, and diarrhea since taking augmentin for cat bite.Given zofran and 250 cc fluid in ambulance       HPI  Priscilla Alonzo is a 76 y.o. 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  The patient states she was bitten by her cat, saw her primary care physician yesterday.  She was given an injection of antibiotics, and given a prescription for Augmentin.  After taking her first dose this morning she developed significant diarrhea about an hour later.  She then developed nausea, vomiting, and upper abdominal pain.  She describes the pain as cramping and sharp at times.  She has had no blood in her emesis or stools.  She has had multiple episodes of vomiting and watery diarrhea.  She has had her gallbladder out previously.  She denies fever, shaking chills, sore throat, cough, congestion, any difficulty breathing.  She has had no urinary symptoms such as increased urinary frequency or dysuria.    REVIEW OF SYSTEMS  See HPI for further details. All other systems are negative.     PAST MEDICAL HISTORY  Past Medical History:   Diagnosis Date   • Anesthesia     a little med goes a long way   • Cancer (HCC)     squamous cell/skin right forearm   • Hypertension    • Indigestion    • Joint replacement     titanium  C5-7 neck fusion/ retainer lower mouth   • Unspecified disorder of thyroid     hypo   • Unspecified hemorrhagic conditions     takes a little longer to clot       FAMILY HISTORY  Family History   Problem Relation Age of Onset   • Heart Disease Other    • Hypertension Other    • Cancer Mother    • Diabetes Father        SOCIAL HISTORY  Social History     Tobacco Use   • Smoking status: Never Smoker   •  Smokeless tobacco: Never Used   Vaping Use   • Vaping Use: Never used   Substance Use Topics   • Alcohol use: Yes     Comment: 0-2 per week   • Drug use: No      Social History     Substance and Sexual Activity   Drug Use No       SURGICAL HISTORY  Past Surgical History:   Procedure Laterality Date   • ANTERIOR REPAIR  8/28/2012    Performed by MORALES CAMPOS at [facility]   • POSTERIOR REPAIR  8/28/2012    Performed by MORALES CAMPOS at [facility]   • CYSTOSCOPY  8/28/2012    Performed by MORALES CAMPOS at [facility]   • LUMBAR LAMINECTOMY DISKECTOMY  1/8/2012    Performed by DENISSE YANCEY at [facility]   • LUMBAR DECOMPRESSION  1/8/2012    Performed by DENISSE YANCEY at [facility]   • CYSTOSCOPY  3/31/2009    Performed by MORALES CAMPOS at [facility]   • ANTERIOR REPAIR  3/31/2009    Performed by MORALES CAMPOS at [facility]   • CYSTOSCOPY  1/20/2009    Performed by MORALES CAMPOS at [facility]   • URETERAL DILATATION  1/20/2009    Performed by MORALES CAMPOS at [facility]   • ANTERIOR AND POSTERIOR REPAIR  1/20/2009    Performed by MORALES CAMPOS at [facility]   • ELLE BY LAPAROSCOPY  7/28/08    Performed by GANSER, JOHN H at [facility]   • LIVER BIOPSY LAPAROSCOPIC  7/28/08    Performed by GANSER, JOHN H at [facility]   • CERVICAL FUSION POSTERIOR  2005    C5-C7   • SHOULDER ARTHROSCOPY  1995    right   • HYSTERECTOMY, TOTAL ABDOMINAL  1985   • GYN SURGERY         CURRENT MEDICATIONS  Home Medications     Reviewed by Filomena Bee RSIMI (Registered Nurse) on 08/21/21 at 1740  Med List Status: Not Addressed    Medication Last Dose Status    ATENOLOL 50 MG PO TABS None Active    Calcium-Magnesium-Vitamin D (CITRACAL CALCIUM+D) 600- MG-MG-UNIT TB24 None Active    escitalopram (LEXAPRO) 10 MG Tab None Active    estradiol  "(VIVELLE-DOT) 0.0375 MG/24HR patch None Active    MULTIVITAMINS PO None Active    NIFEdipine (PROCARDIA) 10 MG Cap None Active    SYNTHROID 88 MCG PO TABS None Active    VITAMIN D 1000 UNIT PO TABS None Active    zolpidem (AMBIEN) 10 MG TABS None Active                ALLERGIES  Allergies   Allergen Reactions   • Actonel [Risedronate Sodium]      Swelling in knees   • Boniva [Ibandronate Sodium] Vomiting   • Levaquin      Aching back of knees       PHYSICAL EXAM0  VITAL SIGNS: Blood Pressure 154/63   Pulse 78   Temperature 36.7 °C (98 °F) (Temporal)   Respiration 18   Height 1.626 m (5' 4\")   Weight 56.7 kg (125 lb)   Oxygen Saturation 95%   Body Mass Index 21.46 kg/m²   Constitutional: Awake, alert, in no acute distress, Non-toxic appearance.   HENT: Atraumatic. Bilateral external ears normal, mucous membranes dry, throat nonerythematous without exudates, nose is normal.  Eyes: PERRL, EOMI, conjunctiva moist, noninjected.  Neck: Nontender, Normal range of motion, No nuchal rigidity, No stridor.   Lymphatic: No lymphadenopathy noted.   Cardiovascular: Regular rate and rhythm, no murmurs, rubs, gallops.  Thorax & Lungs:  Good breath sounds bilaterally, no wheezes, rales, or retractions.  No chest tenderness.  Abdomen: Bowel sounds normal, Soft, nontender, nondistended, no rebound, guarding, masses.  Back: No CVA or spinal tenderness.  Extremities: Intact distal pulses, there is some mild swelling noted over the medial side of the right wrist with a small puncture wound noted from a recent cat bite.  There is some slight erythema.  Patient is able to flex and extend the wrist and fingers.  There is no sign of tenosynovitis.  Skin: Warm, Dry, No rashes.  Swelling tenderness noted over the right medial wrist as noted above.  Musculoskeletal: No joint swelling or tenderness.  Neurologic: Alert & oriented x 3, sensory and motor function normal. No focal deficits.   Psychiatric: Affect normal, Judgment normal, Mood " normal.       LABS  Labs Reviewed   CBC WITH DIFFERENTIAL - Abnormal; Notable for the following components:       Result Value    MCHC 32.9 (*)     Neutrophils-Polys 82.10 (*)     Lymphocytes 8.40 (*)     Neutrophils (Absolute) 8.80 (*)     Lymphs (Absolute) 0.90 (*)     All other components within normal limits   COMP METABOLIC PANEL - Abnormal; Notable for the following components:    Glucose 128 (*)     AST(SGOT) 51 (*)     All other components within normal limits   URINALYSIS - Abnormal; Notable for the following components:    Ketones 15 (*)     All other components within normal limits   COV-2, FLU A/B, AND RSV BY PCR - Abnormal; Notable for the following components:    SARS-CoV-2 by PCR DETECTED (*)     All other components within normal limits    Narrative:     Have you been in close contact with a person who is suspected  or known to be positive for COVID-19 within the last 30 days  (e.g. last seen that person < 30 days ago)->Yes   LIPASE   ESTIMATED GFR       All labs reviewed by me.      RADIOLOGY/PROCEDURES  CT-ABDOMEN-PELVIS WITH   Final Result      1.  No evidence of bowel obstruction.   2.  Fluid-filled loops of small bowel and colon can be seen in the setting of enteritis and diarrhea.   3.  Hepatic steatosis. 2 small hypodense hepatic lesions are too small to characterize.   4.  Atherosclerotic plaque.   5.  Nonvisualization of the appendix, limiting evaluation.   6.  Mild groundglass opacity in the lingula and right middle lobe may be infectious/inflammatory. Covid 19 is not excluded.   7.  Prominence of the common duct may represent ectasia the setting of prior cholecystectomy.   8.  Small hiatal hernia.          The radiologist's interpretations of all radiological studies have been reviewed by me.        COURSE & MEDICAL DECISION MAKING  Pertinent Labs & Imaging studies reviewed. (See chart for details)  The patient presents with the above complaints.  Clinically she appears dehydrated dry  mucous membranes, poor p.o. intake, with vomiting and diarrhea.  IV was placed, she was given a liter bolus of lactated Ringer's, morphine, and Zofran.  I suspect her symptoms are secondary to the Augmentin.  However given her age, she could also have some other etiologies such as inflammatory bowel disease.       CBC showed a white count 10,700, hemoglobin 14.1, platelets 221, 82% polys, chemistry shows normal electrolytes, glucose 128, SGOT 51, SGPT 43, total bilirubin 0.4.  Lipase was normal.  Urinalysis showed 15 ketones, otherwise negative.  CT scan of abdomen/pelvis with IV contrast was obtained which showed no evidence for bowel obstruction.  There were some fluid-filled loops of small bowel and colon which can be seen in setting of enteritis and diarrhea per radiology.  There was also mild groundglass opacities in the lingula and right middle lobe which may be infectious/inflammatory per radiology, and COVID-19 is not excluded.    On recheck, the patient was feeling improved.  Nausea was improved after receiving a GI cocktail and Zofran.  Because of her CT scan findings, a Covid test was obtained.  The patient did note she had had a cough for about 5 days which has since resolved.  She denies any headache, or body aches and pains.  She denies any shortness of breath or any loss of taste or smell.  Follicular PCR was negative.  Covid by PCR was detected.  At this time the patient is breathing comfortably, has no complaints of shortness of breath, oxygen saturation is 97% on room air.  As she did also suffer from a cat bite to the right wrist, and does not appear to be able to tolerate the Augmentin, she was given a dose of Rocephin and doxycycline on the recommendations of pharmacy.  Patient will be continued on a 7-day course of cefuroxime, and doxycycline.  She is given Zofran for nausea.  She was told she did have a positive Covid test and will need to self quarantine.  The patient is to return to the ER  for any worsening pain, recurrent vomiting, increased pain, redness, swelling to her right forearm, or any other problems.  She is to follow-up with her PCP on Monday.      FINAL IMPRESSION  1. Adverse effect of drug, initial encounter    2. Non-intractable vomiting with nausea, unspecified vomiting type    3. Diarrhea, unspecified type    4. Pain of upper abdomen    5. Cat bite of forearm, right, initial encounter    6. COVID-19 virus infection          Electronically signed by: Chano Adames M.D., 8/21/2021 7:02 PM

## 2021-09-07 ENCOUNTER — APPOINTMENT (RX ONLY)
Dept: URBAN - METROPOLITAN AREA CLINIC 4 | Facility: CLINIC | Age: 76
Setting detail: DERMATOLOGY
End: 2021-09-07

## 2021-09-07 PROBLEM — L80 VITILIGO: Status: ACTIVE | Noted: 2021-09-07

## 2021-09-07 PROCEDURE — ? EXCIMER LASER

## 2021-09-07 PROCEDURE — ? ADDITIONAL NOTES

## 2021-09-07 PROCEDURE — 96922 EXCIMER LSR PSRIASIS>500SQCM: CPT

## 2021-09-07 NOTE — PROCEDURE: EXCIMER LASER
Location #2: Around the mouth and armpits
Total Square Area In Cm2 (Optional): 4179
Consent: Written consent obtained, risks reviewed including but not limited to crusting, scabbing, blistering, scarring, darker or lighter pigmentary change, incidental hair removal, bruising, and/or incomplete removal.
Fluence #1 (J/Cm2 Or Mj/Cm2): 600
Fluence #2 (J/Cm2 Or Mj/Cm2): 250
Mode: tile
Fluence Units: J/cm2
Spot Size: 2 x 2 cm
Post-Care Instructions: I reviewed with the patient in detail post-care instructions. Patient should stay away from the sun and wear sun protection until treated areas are fully healed.
Detail Level: Detailed
Location #1: Hands and fingers
Billin (Total area greater than 500 cm2)
Treatment Number: 30
Location #3: Upper chest/back

## 2021-09-10 ENCOUNTER — APPOINTMENT (RX ONLY)
Dept: URBAN - METROPOLITAN AREA CLINIC 4 | Facility: CLINIC | Age: 76
Setting detail: DERMATOLOGY
End: 2021-09-10

## 2021-09-10 PROBLEM — L80 VITILIGO: Status: ACTIVE | Noted: 2021-09-10

## 2021-09-10 PROCEDURE — 96922 EXCIMER LSR PSRIASIS>500SQCM: CPT

## 2021-09-10 PROCEDURE — ? ADDITIONAL NOTES

## 2021-09-10 PROCEDURE — ? EXCIMER LASER

## 2021-09-10 NOTE — PROCEDURE: EXCIMER LASER
Location #2: Around the mouth and armpits
Total Square Area In Cm2 (Optional): 1141
Consent: Written consent obtained, risks reviewed including but not limited to crusting, scabbing, blistering, scarring, darker or lighter pigmentary change, incidental hair removal, bruising, and/or incomplete removal.
Fluence #1 (J/Cm2 Or Mj/Cm2): 600
Fluence #2 (J/Cm2 Or Mj/Cm2): 250
Mode: tile
Fluence Units: J/cm2
Spot Size: 2 x 2 cm
Post-Care Instructions: I reviewed with the patient in detail post-care instructions. Patient should stay away from the sun and wear sun protection until treated areas are fully healed.
Detail Level: Detailed
Location #1: Hands and fingers
Billin (Total area greater than 500 cm2)
Treatment Number: 31
Location #3: Upper chest/back

## 2021-09-14 ENCOUNTER — APPOINTMENT (RX ONLY)
Dept: URBAN - METROPOLITAN AREA CLINIC 4 | Facility: CLINIC | Age: 76
Setting detail: DERMATOLOGY
End: 2021-09-14

## 2021-09-14 PROBLEM — L80 VITILIGO: Status: ACTIVE | Noted: 2021-09-14

## 2021-09-14 PROCEDURE — ? ADDITIONAL NOTES

## 2021-09-14 PROCEDURE — 96922 EXCIMER LSR PSRIASIS>500SQCM: CPT

## 2021-09-14 PROCEDURE — ? EXCIMER LASER

## 2021-09-14 NOTE — PROCEDURE: EXCIMER LASER
Location #2: Around the mouth and armpits
Total Square Area In Cm2 (Optional): 5996
Consent: Written consent obtained, risks reviewed including but not limited to crusting, scabbing, blistering, scarring, darker or lighter pigmentary change, incidental hair removal, bruising, and/or incomplete removal.
Fluence #1 (J/Cm2 Or Mj/Cm2): 650
Fluence #2 (J/Cm2 Or Mj/Cm2): 300
Mode: tile
Fluence Units: J/cm2
Spot Size: 2 x 2 cm
Post-Care Instructions: I reviewed with the patient in detail post-care instructions. Patient should stay away from the sun and wear sun protection until treated areas are fully healed.
Detail Level: Detailed
Location #1: Hands and fingers
Billin (Total area greater than 500 cm2)
Treatment Number: 32
Location #3: Upper chest/back

## 2021-09-20 ENCOUNTER — APPOINTMENT (RX ONLY)
Dept: URBAN - METROPOLITAN AREA CLINIC 4 | Facility: CLINIC | Age: 76
Setting detail: DERMATOLOGY
End: 2021-09-20

## 2021-09-20 PROBLEM — L80 VITILIGO: Status: ACTIVE | Noted: 2021-09-20

## 2021-09-20 PROCEDURE — ? EXCIMER LASER

## 2021-09-20 PROCEDURE — 96922 EXCIMER LSR PSRIASIS>500SQCM: CPT

## 2021-09-20 PROCEDURE — ? ADDITIONAL NOTES

## 2021-09-20 NOTE — PROCEDURE: EXCIMER LASER
Location #2: Around the mouth and armpits
Total Square Area In Cm2 (Optional): 1085
Consent: Written consent obtained, risks reviewed including but not limited to crusting, scabbing, blistering, scarring, darker or lighter pigmentary change, incidental hair removal, bruising, and/or incomplete removal.
Fluence #1 (J/Cm2 Or Mj/Cm2): 700
Fluence #2 (J/Cm2 Or Mj/Cm2): 300
Mode: tile
Fluence Units: J/cm2
Spot Size: 2 x 2 cm
Post-Care Instructions: I reviewed with the patient in detail post-care instructions. Patient should stay away from the sun and wear sun protection until treated areas are fully healed.
Detail Level: Detailed
Location #1: Hands and fingers
Billin (Total area greater than 500 cm2)
Treatment Number: 33
Location #3: Upper chest/back

## 2021-09-24 ENCOUNTER — APPOINTMENT (RX ONLY)
Dept: URBAN - METROPOLITAN AREA CLINIC 4 | Facility: CLINIC | Age: 76
Setting detail: DERMATOLOGY
End: 2021-09-24

## 2021-09-24 PROBLEM — L80 VITILIGO: Status: ACTIVE | Noted: 2021-09-24

## 2021-09-24 PROCEDURE — ? ADDITIONAL NOTES

## 2021-09-24 PROCEDURE — 96922 EXCIMER LSR PSRIASIS>500SQCM: CPT

## 2021-09-24 PROCEDURE — ? EXCIMER LASER

## 2021-09-24 NOTE — PROCEDURE: EXCIMER LASER
Location #2: Around the mouth and armpits
Total Square Area In Cm2 (Optional): 2751
Consent: Written consent obtained, risks reviewed including but not limited to crusting, scabbing, blistering, scarring, darker or lighter pigmentary change, incidental hair removal, bruising, and/or incomplete removal.
Fluence #1 (J/Cm2 Or Mj/Cm2): 750
Fluence #2 (J/Cm2 Or Mj/Cm2): 300
Mode: tile
Fluence Units: J/cm2
Spot Size: 2 x 2 cm
Post-Care Instructions: I reviewed with the patient in detail post-care instructions. Patient should stay away from the sun and wear sun protection until treated areas are fully healed.
Detail Level: Detailed
Location #1: Hands and fingers
Billin (Total area greater than 500 cm2)
Treatment Number: 34
Location #3: Upper chest/back

## 2021-09-27 ENCOUNTER — APPOINTMENT (RX ONLY)
Dept: URBAN - METROPOLITAN AREA CLINIC 4 | Facility: CLINIC | Age: 76
Setting detail: DERMATOLOGY
End: 2021-09-27

## 2021-09-27 PROBLEM — L80 VITILIGO: Status: ACTIVE | Noted: 2021-09-27

## 2021-09-27 PROCEDURE — ? EXCIMER LASER

## 2021-09-27 PROCEDURE — 96922 EXCIMER LSR PSRIASIS>500SQCM: CPT

## 2021-09-27 PROCEDURE — ? ADDITIONAL NOTES

## 2021-09-27 NOTE — PROCEDURE: EXCIMER LASER
Location #2: Around the mouth and armpits
Total Square Area In Cm2 (Optional): 1008
Consent: Written consent obtained, risks reviewed including but not limited to crusting, scabbing, blistering, scarring, darker or lighter pigmentary change, incidental hair removal, bruising, and/or incomplete removal.
Fluence #1 (J/Cm2 Or Mj/Cm2): 800
Fluence #2 (J/Cm2 Or Mj/Cm2): 350
Mode: tile
Fluence Units: J/cm2
Spot Size: 2 x 2 cm
Post-Care Instructions: I reviewed with the patient in detail post-care instructions. Patient should stay away from the sun and wear sun protection until treated areas are fully healed.
Detail Level: Detailed
Location #1: Hands and fingers
Billin (Total area greater than 500 cm2)
Treatment Number: 35
Location #3: Upper chest/back

## 2021-10-05 ENCOUNTER — APPOINTMENT (RX ONLY)
Dept: URBAN - METROPOLITAN AREA CLINIC 4 | Facility: CLINIC | Age: 76
Setting detail: DERMATOLOGY
End: 2021-10-05

## 2021-10-05 PROBLEM — L80 VITILIGO: Status: ACTIVE | Noted: 2021-10-05

## 2021-10-05 PROCEDURE — ? EXCIMER LASER

## 2021-10-05 PROCEDURE — ? ADDITIONAL NOTES

## 2021-10-05 PROCEDURE — 96922 EXCIMER LSR PSRIASIS>500SQCM: CPT

## 2021-10-05 NOTE — PROCEDURE: EXCIMER LASER
Location #2: Around the mouth and armpits
Total Square Area In Cm2 (Optional): 6712
Consent: Written consent obtained, risks reviewed including but not limited to crusting, scabbing, blistering, scarring, darker or lighter pigmentary change, incidental hair removal, bruising, and/or incomplete removal.
Fluence #1 (J/Cm2 Or Mj/Cm2): 800
Fluence #2 (J/Cm2 Or Mj/Cm2): 350
Mode: tile
Fluence Units: J/cm2
Spot Size: 2 x 2 cm
Post-Care Instructions: I reviewed with the patient in detail post-care instructions. Patient should stay away from the sun and wear sun protection until treated areas are fully healed.
Detail Level: Detailed
Location #1: Hands and fingers
Billin (Total area greater than 500 cm2)
Treatment Number: 36
Location #3: Upper chest/back

## 2021-10-08 ENCOUNTER — APPOINTMENT (RX ONLY)
Dept: URBAN - METROPOLITAN AREA CLINIC 4 | Facility: CLINIC | Age: 76
Setting detail: DERMATOLOGY
End: 2021-10-08

## 2021-10-08 PROBLEM — L80 VITILIGO: Status: ACTIVE | Noted: 2021-10-08

## 2021-10-08 PROCEDURE — ? EXCIMER LASER

## 2021-10-08 PROCEDURE — 96922 EXCIMER LSR PSRIASIS>500SQCM: CPT

## 2021-10-08 PROCEDURE — ? ADDITIONAL NOTES

## 2021-10-08 NOTE — PROCEDURE: EXCIMER LASER
Location #2: Around the mouth and armpits
Total Square Area In Cm2 (Optional): 1141
Consent: Written consent obtained, risks reviewed including but not limited to crusting, scabbing, blistering, scarring, darker or lighter pigmentary change, incidental hair removal, bruising, and/or incomplete removal.
Fluence #1 (J/Cm2 Or Mj/Cm2): 800
Fluence #2 (J/Cm2 Or Mj/Cm2): 350
Mode: tile
Fluence Units: J/cm2
Spot Size: 2 x 2 cm
Post-Care Instructions: I reviewed with the patient in detail post-care instructions. Patient should stay away from the sun and wear sun protection until treated areas are fully healed.
Detail Level: Detailed
Location #1: Hands and fingers
Billin (Total area greater than 500 cm2)
Treatment Number: 37
Location #3: Upper chest/back

## 2021-10-12 ENCOUNTER — APPOINTMENT (RX ONLY)
Dept: URBAN - METROPOLITAN AREA CLINIC 4 | Facility: CLINIC | Age: 76
Setting detail: DERMATOLOGY
End: 2021-10-12

## 2021-10-12 PROBLEM — L80 VITILIGO: Status: ACTIVE | Noted: 2021-10-12

## 2021-10-12 PROCEDURE — 96922 EXCIMER LSR PSRIASIS>500SQCM: CPT

## 2021-10-12 PROCEDURE — ? ADDITIONAL NOTES

## 2021-10-12 PROCEDURE — ? EXCIMER LASER

## 2021-10-12 NOTE — PROCEDURE: EXCIMER LASER
Location #2: Around the mouth and armpits
Total Square Area In Cm2 (Optional): 1147
Consent: Written consent obtained, risks reviewed including but not limited to crusting, scabbing, blistering, scarring, darker or lighter pigmentary change, incidental hair removal, bruising, and/or incomplete removal.
Fluence #1 (J/Cm2 Or Mj/Cm2): 850
Fluence #2 (J/Cm2 Or Mj/Cm2): 350
Mode: tile
Fluence Units: J/cm2
Spot Size: 2 x 2 cm
Post-Care Instructions: I reviewed with the patient in detail post-care instructions. Patient should stay away from the sun and wear sun protection until treated areas are fully healed.
Detail Level: Detailed
Location #1: Hands and fingers
Billin (Total area greater than 500 cm2)
Treatment Number: 38
Location #3: Upper chest/back

## 2021-10-15 ENCOUNTER — APPOINTMENT (RX ONLY)
Dept: URBAN - METROPOLITAN AREA CLINIC 4 | Facility: CLINIC | Age: 76
Setting detail: DERMATOLOGY
End: 2021-10-15

## 2021-10-15 PROBLEM — L80 VITILIGO: Status: ACTIVE | Noted: 2021-10-15

## 2021-10-15 PROCEDURE — ? ADDITIONAL NOTES

## 2021-10-15 PROCEDURE — 96922 EXCIMER LSR PSRIASIS>500SQCM: CPT

## 2021-10-15 PROCEDURE — ? EXCIMER LASER

## 2021-10-15 NOTE — PROCEDURE: EXCIMER LASER
Location #2: Around the mouth and armpits
Total Square Area In Cm2 (Optional): 98
Consent: Written consent obtained, risks reviewed including but not limited to crusting, scabbing, blistering, scarring, darker or lighter pigmentary change, incidental hair removal, bruising, and/or incomplete removal.
Fluence #1 (J/Cm2 Or Mj/Cm2): 850
Fluence #2 (J/Cm2 Or Mj/Cm2): 350
Mode: tile
Fluence Units: J/cm2
Spot Size: 2 x 2 cm
Post-Care Instructions: I reviewed with the patient in detail post-care instructions. Patient should stay away from the sun and wear sun protection until treated areas are fully healed.
Detail Level: Detailed
Location #1: Hands and fingers
Billin (Total area greater than 500 cm2)
Treatment Number: 38
Location #3: Upper chest/back

## 2021-10-19 ENCOUNTER — APPOINTMENT (RX ONLY)
Dept: URBAN - METROPOLITAN AREA CLINIC 4 | Facility: CLINIC | Age: 76
Setting detail: DERMATOLOGY
End: 2021-10-19

## 2021-10-19 PROBLEM — L80 VITILIGO: Status: ACTIVE | Noted: 2021-10-19

## 2021-10-19 PROCEDURE — ? ADDITIONAL NOTES

## 2021-10-19 PROCEDURE — 96922 EXCIMER LSR PSRIASIS>500SQCM: CPT

## 2021-10-19 PROCEDURE — ? EXCIMER LASER

## 2021-10-19 NOTE — PROCEDURE: EXCIMER LASER
Location #2: Around the mouth and armpits
Total Square Area In Cm2 (Optional): 451
Consent: Written consent obtained, risks reviewed including but not limited to crusting, scabbing, blistering, scarring, darker or lighter pigmentary change, incidental hair removal, bruising, and/or incomplete removal.
Fluence #1 (J/Cm2 Or Mj/Cm2): 900
Fluence #2 (J/Cm2 Or Mj/Cm2): 400
Mode: tile
Fluence Units: J/cm2
Spot Size: 2 x 2 cm
Post-Care Instructions: I reviewed with the patient in detail post-care instructions. Patient should stay away from the sun and wear sun protection until treated areas are fully healed.
Detail Level: Detailed
Location #1: Hands and fingers
Billin (Total area greater than 500 cm2)
Treatment Number: 39
Location #3: Upper chest/back

## 2021-10-22 ENCOUNTER — APPOINTMENT (RX ONLY)
Dept: URBAN - METROPOLITAN AREA CLINIC 4 | Facility: CLINIC | Age: 76
Setting detail: DERMATOLOGY
End: 2021-10-22

## 2021-10-22 PROBLEM — L80 VITILIGO: Status: ACTIVE | Noted: 2021-10-22

## 2021-10-22 PROCEDURE — ? ADDITIONAL NOTES

## 2021-10-22 PROCEDURE — 96922 EXCIMER LSR PSRIASIS>500SQCM: CPT

## 2021-10-22 PROCEDURE — ? EXCIMER LASER

## 2021-10-22 NOTE — PROCEDURE: EXCIMER LASER
Location #2: Around the mouth and armpits
Total Square Area In Cm2 (Optional): 8765
Consent: Written consent obtained, risks reviewed including but not limited to crusting, scabbing, blistering, scarring, darker or lighter pigmentary change, incidental hair removal, bruising, and/or incomplete removal.
Fluence #1 (J/Cm2 Or Mj/Cm2): 900
Fluence #2 (J/Cm2 Or Mj/Cm2): 400
Mode: tile
Fluence Units: J/cm2
Spot Size: 2 x 2 cm
Post-Care Instructions: I reviewed with the patient in detail post-care instructions. Patient should stay away from the sun and wear sun protection until treated areas are fully healed.
Detail Level: Detailed
Location #1: Hands and fingers
Billin (Total area greater than 500 cm2)
Treatment Number: 40
Location #3: Upper chest/back

## 2021-10-26 ENCOUNTER — APPOINTMENT (RX ONLY)
Dept: URBAN - METROPOLITAN AREA CLINIC 4 | Facility: CLINIC | Age: 76
Setting detail: DERMATOLOGY
End: 2021-10-26

## 2021-10-26 PROBLEM — L80 VITILIGO: Status: ACTIVE | Noted: 2021-10-26

## 2021-10-26 PROCEDURE — 96922 EXCIMER LSR PSRIASIS>500SQCM: CPT

## 2021-10-26 PROCEDURE — ? ADDITIONAL NOTES

## 2021-10-26 PROCEDURE — ? EXCIMER LASER

## 2021-10-26 NOTE — PROCEDURE: EXCIMER LASER
Location #2: Around the mouth and armpits
Total Square Area In Cm2 (Optional): 1127
Consent: Written consent obtained, risks reviewed including but not limited to crusting, scabbing, blistering, scarring, darker or lighter pigmentary change, incidental hair removal, bruising, and/or incomplete removal.
Fluence #1 (J/Cm2 Or Mj/Cm2): 900
Fluence #2 (J/Cm2 Or Mj/Cm2): 400
Mode: tile
Fluence Units: J/cm2
Spot Size: 2 x 2 cm
Post-Care Instructions: I reviewed with the patient in detail post-care instructions. Patient should stay away from the sun and wear sun protection until treated areas are fully healed.
Detail Level: Detailed
Location #1: Hands and fingers
Billin (Total area greater than 500 cm2)
Treatment Number: 41
Location #3: Upper chest/back

## 2021-11-02 ENCOUNTER — APPOINTMENT (RX ONLY)
Dept: URBAN - METROPOLITAN AREA CLINIC 4 | Facility: CLINIC | Age: 76
Setting detail: DERMATOLOGY
End: 2021-11-02

## 2021-11-02 PROBLEM — L80 VITILIGO: Status: ACTIVE | Noted: 2021-11-02

## 2021-11-02 PROCEDURE — ? EXCIMER LASER

## 2021-11-02 PROCEDURE — 96922 EXCIMER LSR PSRIASIS>500SQCM: CPT

## 2021-11-02 PROCEDURE — ? ADDITIONAL NOTES

## 2021-11-02 NOTE — PROCEDURE: EXCIMER LASER
Location #2: Around the mouth and armpits
Total Square Area In Cm2 (Optional): 1016
Consent: Written consent obtained, risks reviewed including but not limited to crusting, scabbing, blistering, scarring, darker or lighter pigmentary change, incidental hair removal, bruising, and/or incomplete removal.
Fluence #1 (J/Cm2 Or Mj/Cm2): 950
Fluence #2 (J/Cm2 Or Mj/Cm2): 450
Mode: tile
Fluence Units: J/cm2
Spot Size: 2 x 2 cm
Post-Care Instructions: I reviewed with the patient in detail post-care instructions. Patient should stay away from the sun and wear sun protection until treated areas are fully healed.
Detail Level: Detailed
Location #1: Hands and fingers
Billin (Total area greater than 500 cm2)
Treatment Number: 42
Location #3: Upper chest/back

## 2021-11-05 ENCOUNTER — APPOINTMENT (RX ONLY)
Dept: URBAN - METROPOLITAN AREA CLINIC 4 | Facility: CLINIC | Age: 76
Setting detail: DERMATOLOGY
End: 2021-11-05

## 2021-11-05 PROBLEM — L80 VITILIGO: Status: ACTIVE | Noted: 2021-11-05

## 2021-11-05 PROCEDURE — ? ADDITIONAL NOTES

## 2021-11-05 PROCEDURE — 96922 EXCIMER LSR PSRIASIS>500SQCM: CPT

## 2021-11-05 PROCEDURE — ? EXCIMER LASER

## 2021-11-05 NOTE — PROCEDURE: EXCIMER LASER
Location #2: Around the mouth and armpits
Total Square Area In Cm2 (Optional): 1024
Consent: Written consent obtained, risks reviewed including but not limited to crusting, scabbing, blistering, scarring, darker or lighter pigmentary change, incidental hair removal, bruising, and/or incomplete removal.
Fluence #1 (J/Cm2 Or Mj/Cm2): 950
Fluence #2 (J/Cm2 Or Mj/Cm2): 450
Mode: tile
Fluence Units: J/cm2
Spot Size: 2 x 2 cm
Post-Care Instructions: I reviewed with the patient in detail post-care instructions. Patient should stay away from the sun and wear sun protection until treated areas are fully healed.
Detail Level: Detailed
Location #1: Hands and fingers
Billin (Total area greater than 500 cm2)
Treatment Number: 43
Location #3: Upper chest/back

## 2021-11-09 ENCOUNTER — APPOINTMENT (RX ONLY)
Dept: URBAN - METROPOLITAN AREA CLINIC 4 | Facility: CLINIC | Age: 76
Setting detail: DERMATOLOGY
End: 2021-11-09

## 2021-11-09 PROBLEM — L80 VITILIGO: Status: ACTIVE | Noted: 2021-11-09

## 2021-11-09 PROCEDURE — 96922 EXCIMER LSR PSRIASIS>500SQCM: CPT

## 2021-11-09 PROCEDURE — ? EXCIMER LASER

## 2021-11-09 PROCEDURE — ? ADDITIONAL NOTES

## 2021-11-09 NOTE — PROCEDURE: EXCIMER LASER
Location #2: Around the mouth and armpits
Total Square Area In Cm2 (Optional): 981
Consent: Written consent obtained, risks reviewed including but not limited to crusting, scabbing, blistering, scarring, darker or lighter pigmentary change, incidental hair removal, bruising, and/or incomplete removal.
Fluence #1 (J/Cm2 Or Mj/Cm2): 950
Fluence #2 (J/Cm2 Or Mj/Cm2): 450
Mode: tile
Fluence Units: J/cm2
Spot Size: 2 x 2 cm
Post-Care Instructions: I reviewed with the patient in detail post-care instructions. Patient should stay away from the sun and wear sun protection until treated areas are fully healed.
Detail Level: Detailed
Location #1: Hands and fingers
Billin (Total area greater than 500 cm2)
Treatment Number: 44
Location #3: Upper chest/back

## 2021-11-12 ENCOUNTER — APPOINTMENT (RX ONLY)
Dept: URBAN - METROPOLITAN AREA CLINIC 4 | Facility: CLINIC | Age: 76
Setting detail: DERMATOLOGY
End: 2021-11-12

## 2021-11-12 PROBLEM — L80 VITILIGO: Status: ACTIVE | Noted: 2021-11-12

## 2021-11-12 PROCEDURE — ? EXCIMER LASER

## 2021-11-12 PROCEDURE — ? ADDITIONAL NOTES

## 2021-11-12 PROCEDURE — 96922 EXCIMER LSR PSRIASIS>500SQCM: CPT

## 2021-11-12 NOTE — PROCEDURE: EXCIMER LASER
Location #2: Around the mouth and armpits
Total Square Area In Cm2 (Optional): 1112
Consent: Written consent obtained, risks reviewed including but not limited to crusting, scabbing, blistering, scarring, darker or lighter pigmentary change, incidental hair removal, bruising, and/or incomplete removal.
Fluence #1 (J/Cm2 Or Mj/Cm2): 1000
Fluence #2 (J/Cm2 Or Mj/Cm2): 450
Mode: tile
Fluence Units: J/cm2
Spot Size: 2 x 2 cm
Post-Care Instructions: I reviewed with the patient in detail post-care instructions. Patient should stay away from the sun and wear sun protection until treated areas are fully healed.
Detail Level: Detailed
Location #1: Hands and fingers
Billin (Total area greater than 500 cm2)
Treatment Number: 45
Location #3: Upper chest/back

## 2021-11-16 ENCOUNTER — APPOINTMENT (RX ONLY)
Dept: URBAN - METROPOLITAN AREA CLINIC 4 | Facility: CLINIC | Age: 76
Setting detail: DERMATOLOGY
End: 2021-11-16

## 2021-11-16 PROBLEM — L80 VITILIGO: Status: ACTIVE | Noted: 2021-11-16

## 2021-11-16 PROCEDURE — 96922 EXCIMER LSR PSRIASIS>500SQCM: CPT

## 2021-11-16 PROCEDURE — ? ADDITIONAL NOTES

## 2021-11-16 PROCEDURE — ? EXCIMER LASER

## 2021-11-16 NOTE — PROCEDURE: EXCIMER LASER
Location #2: Around the mouth and armpits
Total Square Area In Cm2 (Optional): 3824
Consent: Written consent obtained, risks reviewed including but not limited to crusting, scabbing, blistering, scarring, darker or lighter pigmentary change, incidental hair removal, bruising, and/or incomplete removal.
Fluence #1 (J/Cm2 Or Mj/Cm2): 1000
Fluence #2 (J/Cm2 Or Mj/Cm2): 450
Mode: tile
Fluence Units: J/cm2
Spot Size: 2 x 2 cm
Post-Care Instructions: I reviewed with the patient in detail post-care instructions. Patient should stay away from the sun and wear sun protection until treated areas are fully healed.
Detail Level: Detailed
Location #1: Hands and fingers
Billin (Total area greater than 500 cm2)
Treatment Number: 46
Location #3: Upper chest/back

## 2021-11-23 ENCOUNTER — APPOINTMENT (RX ONLY)
Dept: URBAN - METROPOLITAN AREA CLINIC 4 | Facility: CLINIC | Age: 76
Setting detail: DERMATOLOGY
End: 2021-11-23

## 2021-11-23 PROBLEM — L80 VITILIGO: Status: ACTIVE | Noted: 2021-11-23

## 2021-11-23 PROCEDURE — ? EXCIMER LASER

## 2021-11-23 PROCEDURE — 96922 EXCIMER LSR PSRIASIS>500SQCM: CPT

## 2021-11-23 PROCEDURE — ? ADDITIONAL NOTES

## 2021-11-23 NOTE — PROCEDURE: EXCIMER LASER
Location #2: Around the mouth and armpits
Total Square Area In Cm2 (Optional): 1120
Consent: Written consent obtained, risks reviewed including but not limited to crusting, scabbing, blistering, scarring, darker or lighter pigmentary change, incidental hair removal, bruising, and/or incomplete removal.
Fluence #1 (J/Cm2 Or Mj/Cm2): 1000
Fluence #2 (J/Cm2 Or Mj/Cm2): 450
Mode: tile
Fluence Units: J/cm2
Spot Size: 2 x 2 cm
Post-Care Instructions: I reviewed with the patient in detail post-care instructions. Patient should stay away from the sun and wear sun protection until treated areas are fully healed.
Detail Level: Detailed
Location #1: Hands and fingers
Billin (Total area greater than 500 cm2)
Treatment Number: 47
Location #3: Upper chest/back

## 2021-11-30 ENCOUNTER — APPOINTMENT (RX ONLY)
Dept: URBAN - METROPOLITAN AREA CLINIC 4 | Facility: CLINIC | Age: 76
Setting detail: DERMATOLOGY
End: 2021-11-30

## 2021-11-30 PROBLEM — L80 VITILIGO: Status: ACTIVE | Noted: 2021-11-30

## 2021-11-30 PROCEDURE — 96922 EXCIMER LSR PSRIASIS>500SQCM: CPT

## 2021-11-30 PROCEDURE — ? ADDITIONAL NOTES

## 2021-11-30 PROCEDURE — ? EXCIMER LASER

## 2021-11-30 NOTE — PROCEDURE: EXCIMER LASER
Location #2: Around the mouth and armpits
Total Square Area In Cm2 (Optional): 6866
Consent: Written consent obtained, risks reviewed including but not limited to crusting, scabbing, blistering, scarring, darker or lighter pigmentary change, incidental hair removal, bruising, and/or incomplete removal.
Fluence #1 (J/Cm2 Or Mj/Cm2): 1000
Fluence #2 (J/Cm2 Or Mj/Cm2): 450
Mode: tile
Fluence Units: J/cm2
Spot Size: 2 x 2 cm
Post-Care Instructions: I reviewed with the patient in detail post-care instructions. Patient should stay away from the sun and wear sun protection until treated areas are fully healed.
Detail Level: Detailed
Location #1: Hands and fingers
Billin (Total area greater than 500 cm2)
Treatment Number: 48
Location #3: Upper chest/back

## 2021-12-03 ENCOUNTER — APPOINTMENT (RX ONLY)
Dept: URBAN - METROPOLITAN AREA CLINIC 4 | Facility: CLINIC | Age: 76
Setting detail: DERMATOLOGY
End: 2021-12-03

## 2021-12-03 PROBLEM — L80 VITILIGO: Status: ACTIVE | Noted: 2021-12-03

## 2021-12-03 PROCEDURE — ? EXCIMER LASER

## 2021-12-03 PROCEDURE — 96922 EXCIMER LSR PSRIASIS>500SQCM: CPT

## 2021-12-03 PROCEDURE — ? ADDITIONAL NOTES

## 2021-12-03 NOTE — PROCEDURE: EXCIMER LASER
Location #2: Around the mouth and armpits
Total Square Area In Cm2 (Optional): 593
Consent: Written consent obtained, risks reviewed including but not limited to crusting, scabbing, blistering, scarring, darker or lighter pigmentary change, incidental hair removal, bruising, and/or incomplete removal.
Fluence #1 (J/Cm2 Or Mj/Cm2): 1000
Fluence #2 (J/Cm2 Or Mj/Cm2): 400
Mode: tile
Fluence #3 (J/Cm2 Or Mj/Cm2): no treatment
Fluence Units: J/cm2
Spot Size: 2 x 2 cm
Post-Care Instructions: I reviewed with the patient in detail post-care instructions. Patient should stay away from the sun and wear sun protection until treated areas are fully healed.
Detail Level: Detailed
Location #1: Hands and fingers
Billin (Total area greater than 500 cm2)
Treatment Number: 49
Location #3: Upper chest/back

## 2021-12-07 ENCOUNTER — APPOINTMENT (RX ONLY)
Dept: URBAN - METROPOLITAN AREA CLINIC 4 | Facility: CLINIC | Age: 76
Setting detail: DERMATOLOGY
End: 2021-12-07

## 2021-12-07 PROBLEM — L80 VITILIGO: Status: ACTIVE | Noted: 2021-12-07

## 2021-12-07 PROCEDURE — ? EXCIMER LASER

## 2021-12-07 PROCEDURE — 96922 EXCIMER LSR PSRIASIS>500SQCM: CPT

## 2021-12-07 PROCEDURE — ? ADDITIONAL NOTES

## 2021-12-07 NOTE — PROCEDURE: EXCIMER LASER
Location #2: Around the mouth and armpits
Total Square Area In Cm2 (Optional): 1140
Consent: Written consent obtained, risks reviewed including but not limited to crusting, scabbing, blistering, scarring, darker or lighter pigmentary change, incidental hair removal, bruising, and/or incomplete removal.
Fluence #1 (J/Cm2 Or Mj/Cm2): 105
Fluence #2 (J/Cm2 Or Mj/Cm2): 400
Mode: tile
Fluence #3 (J/Cm2 Or Mj/Cm2): 350
Fluence Units: J/cm2
Spot Size: 2 x 2 cm
Post-Care Instructions: I reviewed with the patient in detail post-care instructions. Patient should stay away from the sun and wear sun protection until treated areas are fully healed.
Detail Level: Detailed
Location #1: Hands and fingers
Billin (Total area greater than 500 cm2)
Treatment Number: 50
Location #3: Upper chest/back

## 2021-12-10 ENCOUNTER — APPOINTMENT (RX ONLY)
Dept: URBAN - METROPOLITAN AREA CLINIC 4 | Facility: CLINIC | Age: 76
Setting detail: DERMATOLOGY
End: 2021-12-10

## 2021-12-10 PROBLEM — L80 VITILIGO: Status: ACTIVE | Noted: 2021-12-10

## 2021-12-10 PROCEDURE — ? EXCIMER LASER

## 2021-12-10 PROCEDURE — ? ADDITIONAL NOTES

## 2021-12-10 PROCEDURE — 96922 EXCIMER LSR PSRIASIS>500SQCM: CPT

## 2021-12-10 NOTE — PROCEDURE: EXCIMER LASER
Location #2: Around the mouth and armpits
Total Square Area In Cm2 (Optional): 953
Consent: Written consent obtained, risks reviewed including but not limited to crusting, scabbing, blistering, scarring, darker or lighter pigmentary change, incidental hair removal, bruising, and/or incomplete removal.
Fluence #1 (J/Cm2 Or Mj/Cm2): 1059
Fluence #2 (J/Cm2 Or Mj/Cm2): 400
Mode: tile
Fluence #3 (J/Cm2 Or Mj/Cm2): 350
Fluence Units: J/cm2
Spot Size: 2 x 2 cm
Post-Care Instructions: I reviewed with the patient in detail post-care instructions. Patient should stay away from the sun and wear sun protection until treated areas are fully healed.
Detail Level: Detailed
Location #1: Hands and fingers
Billin (Total area greater than 500 cm2)
Treatment Number: 51
Location #3: Upper chest/back
Total Square Area In Cm2 (Optional): 1140
Treatment Number: 50

## 2021-12-17 ENCOUNTER — APPOINTMENT (RX ONLY)
Dept: URBAN - METROPOLITAN AREA CLINIC 4 | Facility: CLINIC | Age: 76
Setting detail: DERMATOLOGY
End: 2021-12-17

## 2021-12-17 PROBLEM — L80 VITILIGO: Status: ACTIVE | Noted: 2021-12-17

## 2021-12-17 PROCEDURE — 96922 EXCIMER LSR PSRIASIS>500SQCM: CPT

## 2021-12-17 PROCEDURE — ? EXCIMER LASER

## 2021-12-17 PROCEDURE — ? ADDITIONAL NOTES

## 2021-12-17 NOTE — PROCEDURE: EXCIMER LASER
Location #2: Around the mouth and armpits
Total Square Area In Cm2 (Optional): 0376
Consent: Written consent obtained, risks reviewed including but not limited to crusting, scabbing, blistering, scarring, darker or lighter pigmentary change, incidental hair removal, bruising, and/or incomplete removal.
Fluence #1 (J/Cm2 Or Mj/Cm2): 1059
Fluence #2 (J/Cm2 Or Mj/Cm2): 400
Mode: tile
Fluence #3 (J/Cm2 Or Mj/Cm2): 350
Fluence Units: J/cm2
Spot Size: 2 x 2 cm
Post-Care Instructions: I reviewed with the patient in detail post-care instructions. Patient should stay away from the sun and wear sun protection until treated areas are fully healed.
Detail Level: Detailed
Location #1: Hands and fingers
Billin (Total area greater than 500 cm2)
Treatment Number: 52
Location #3: Upper chest/back

## 2021-12-21 ENCOUNTER — APPOINTMENT (RX ONLY)
Dept: URBAN - METROPOLITAN AREA CLINIC 4 | Facility: CLINIC | Age: 76
Setting detail: DERMATOLOGY
End: 2021-12-21

## 2021-12-21 PROBLEM — L80 VITILIGO: Status: ACTIVE | Noted: 2021-12-21

## 2021-12-21 PROCEDURE — ? ADDITIONAL NOTES

## 2021-12-21 PROCEDURE — 96922 EXCIMER LSR PSRIASIS>500SQCM: CPT

## 2021-12-21 PROCEDURE — ? EXCIMER LASER

## 2021-12-21 NOTE — PROCEDURE: EXCIMER LASER
Location #2: Around the mouth and armpits
Total Square Area In Cm2 (Optional): 0621
Consent: Written consent obtained, risks reviewed including but not limited to crusting, scabbing, blistering, scarring, darker or lighter pigmentary change, incidental hair removal, bruising, and/or incomplete removal.
Fluence #1 (J/Cm2 Or Mj/Cm2): 1056
Fluence #2 (J/Cm2 Or Mj/Cm2): 400
Mode: tile
Fluence #3 (J/Cm2 Or Mj/Cm2): 350
Fluence Units: J/cm2
Spot Size: 2 x 2 cm
Post-Care Instructions: I reviewed with the patient in detail post-care instructions. Patient should stay away from the sun and wear sun protection until treated areas are fully healed.
Detail Level: Detailed
Location #1: Hands and fingers
Billin (Total area greater than 500 cm2)
Treatment Number: 53
Location #3: Upper chest/back

## 2021-12-28 ENCOUNTER — APPOINTMENT (RX ONLY)
Dept: URBAN - METROPOLITAN AREA CLINIC 4 | Facility: CLINIC | Age: 76
Setting detail: DERMATOLOGY
End: 2021-12-28

## 2021-12-28 PROBLEM — L80 VITILIGO: Status: ACTIVE | Noted: 2021-12-28

## 2021-12-28 PROCEDURE — ? EXCIMER LASER

## 2021-12-28 PROCEDURE — 96922 EXCIMER LSR PSRIASIS>500SQCM: CPT

## 2021-12-28 PROCEDURE — ? ADDITIONAL NOTES

## 2021-12-28 NOTE — PROCEDURE: EXCIMER LASER
Location #2: Around the mouth and armpits
Total Square Area In Cm2 (Optional): 1183
Consent: Written consent obtained, risks reviewed including but not limited to crusting, scabbing, blistering, scarring, darker or lighter pigmentary change, incidental hair removal, bruising, and/or incomplete removal.
Fluence #1 (J/Cm2 Or Mj/Cm2): 1052
Fluence #2 (J/Cm2 Or Mj/Cm2): 400
Mode: tile
Fluence #3 (J/Cm2 Or Mj/Cm2): 350
Fluence Units: J/cm2
Spot Size: 2 x 2 cm
Post-Care Instructions: I reviewed with the patient in detail post-care instructions. Patient should stay away from the sun and wear sun protection until treated areas are fully healed.
Detail Level: Detailed
Location #1: Hands and fingers
Billin (Total area greater than 500 cm2)
Treatment Number: 54
Location #3: Upper chest/back

## 2022-01-04 ENCOUNTER — APPOINTMENT (RX ONLY)
Dept: URBAN - METROPOLITAN AREA CLINIC 4 | Facility: CLINIC | Age: 77
Setting detail: DERMATOLOGY
End: 2022-01-04

## 2022-01-04 PROBLEM — L80 VITILIGO: Status: ACTIVE | Noted: 2022-01-04

## 2022-01-04 PROCEDURE — 96922 EXCIMER LSR PSRIASIS>500SQCM: CPT

## 2022-01-04 PROCEDURE — ? ADDITIONAL NOTES

## 2022-01-04 PROCEDURE — ? EXCIMER LASER

## 2022-01-04 NOTE — PROCEDURE: EXCIMER LASER
Location #2: Around the mouth and armpits
Total Square Area In Cm2 (Optional): 5497
Consent: Written consent obtained, risks reviewed including but not limited to crusting, scabbing, blistering, scarring, darker or lighter pigmentary change, incidental hair removal, bruising, and/or incomplete removal.
Fluence #1 (J/Cm2 Or Mj/Cm2): 105
Fluence #2 (J/Cm2 Or Mj/Cm2): 400
Mode: tile
Fluence #3 (J/Cm2 Or Mj/Cm2): 350
Fluence Units: J/cm2
Spot Size: 2 x 2 cm
Post-Care Instructions: I reviewed with the patient in detail post-care instructions. Patient should stay away from the sun and wear sun protection until treated areas are fully healed.
Detail Level: Detailed
Location #1: Hands and fingers
Billin (Total area greater than 500 cm2)
Treatment Number: 55
Location #3: Upper chest/back

## 2022-01-07 ENCOUNTER — APPOINTMENT (RX ONLY)
Dept: URBAN - METROPOLITAN AREA CLINIC 4 | Facility: CLINIC | Age: 77
Setting detail: DERMATOLOGY
End: 2022-01-07

## 2022-01-07 PROBLEM — L80 VITILIGO: Status: ACTIVE | Noted: 2022-01-07

## 2022-01-07 PROCEDURE — 96922 EXCIMER LSR PSRIASIS>500SQCM: CPT

## 2022-01-07 PROCEDURE — ? ADDITIONAL NOTES

## 2022-01-07 PROCEDURE — ? EXCIMER LASER

## 2022-01-07 NOTE — PROCEDURE: EXCIMER LASER
Location #2: Around the mouth and armpits
Total Square Area In Cm2 (Optional): 840
Consent: Written consent obtained, risks reviewed including but not limited to crusting, scabbing, blistering, scarring, darker or lighter pigmentary change, incidental hair removal, bruising, and/or incomplete removal.
Fluence #1 (J/Cm2 Or Mj/Cm2): 1057
Fluence #2 (J/Cm2 Or Mj/Cm2): 350
Mode: tile
Fluence Units: J/cm2
Spot Size: 2 x 2 cm
Post-Care Instructions: I reviewed with the patient in detail post-care instructions. Patient should stay away from the sun and wear sun protection until treated areas are fully healed.
Detail Level: Detailed
Location #1: Hands and fingers
Billin (Total area greater than 500 cm2)
Treatment Number: 56
Location #3: Upper chest/back

## 2022-01-11 ENCOUNTER — APPOINTMENT (RX ONLY)
Dept: URBAN - METROPOLITAN AREA CLINIC 4 | Facility: CLINIC | Age: 77
Setting detail: DERMATOLOGY
End: 2022-01-11

## 2022-01-11 PROBLEM — L80 VITILIGO: Status: ACTIVE | Noted: 2022-01-11

## 2022-01-11 PROCEDURE — 96922 EXCIMER LSR PSRIASIS>500SQCM: CPT

## 2022-01-11 PROCEDURE — ? ADDITIONAL NOTES

## 2022-01-11 PROCEDURE — ? EXCIMER LASER

## 2022-01-11 NOTE — PROCEDURE: EXCIMER LASER
Location #2: Around the mouth and armpits
Total Square Area In Cm2 (Optional): 860
Consent: Written consent obtained, risks reviewed including but not limited to crusting, scabbing, blistering, scarring, darker or lighter pigmentary change, incidental hair removal, bruising, and/or incomplete removal.
Fluence #1 (J/Cm2 Or Mj/Cm2): 1051
Fluence #2 (J/Cm2 Or Mj/Cm2): 350
Mode: tile
Fluence Units: J/cm2
Spot Size: 2 x 2 cm
Post-Care Instructions: I reviewed with the patient in detail post-care instructions. Patient should stay away from the sun and wear sun protection until treated areas are fully healed.
Detail Level: Detailed
Location #1: Hands and fingers
Billin (Total area greater than 500 cm2)
Treatment Number: 57
Location #3: Upper chest/back

## 2022-01-14 ENCOUNTER — APPOINTMENT (RX ONLY)
Dept: URBAN - METROPOLITAN AREA CLINIC 4 | Facility: CLINIC | Age: 77
Setting detail: DERMATOLOGY
End: 2022-01-14

## 2022-01-14 PROBLEM — L80 VITILIGO: Status: ACTIVE | Noted: 2022-01-14

## 2022-01-14 PROCEDURE — ? EXCIMER LASER

## 2022-01-14 PROCEDURE — 96922 EXCIMER LSR PSRIASIS>500SQCM: CPT

## 2022-01-14 PROCEDURE — ? ADDITIONAL NOTES

## 2022-01-14 NOTE — PROCEDURE: EXCIMER LASER
Location #2: Around the mouth and armpits
Total Square Area In Cm2 (Optional): 609
Consent: Written consent obtained, risks reviewed including but not limited to crusting, scabbing, blistering, scarring, darker or lighter pigmentary change, incidental hair removal, bruising, and/or incomplete removal.
Fluence #1 (J/Cm2 Or Mj/Cm2): 1051
Fluence #2 (J/Cm2 Or Mj/Cm2): 350
Mode: tile
Fluence #3 (J/Cm2 Or Mj/Cm2): not treated today
Fluence Units: J/cm2
Spot Size: 2 x 2 cm
Post-Care Instructions: I reviewed with the patient in detail post-care instructions. Patient should stay away from the sun and wear sun protection until treated areas are fully healed.
Detail Level: Detailed
Location #1: Hands and fingers
Billin (Total area greater than 500 cm2)
Treatment Number: 58
Location #3: Upper chest/back

## 2022-01-18 ENCOUNTER — APPOINTMENT (RX ONLY)
Dept: URBAN - METROPOLITAN AREA CLINIC 4 | Facility: CLINIC | Age: 77
Setting detail: DERMATOLOGY
End: 2022-01-18

## 2022-01-18 PROBLEM — L80 VITILIGO: Status: ACTIVE | Noted: 2022-01-18

## 2022-01-18 PROCEDURE — ? ADDITIONAL NOTES

## 2022-01-18 PROCEDURE — 96922 EXCIMER LSR PSRIASIS>500SQCM: CPT

## 2022-01-18 PROCEDURE — ? EXCIMER LASER

## 2022-01-18 NOTE — PROCEDURE: EXCIMER LASER
Location #2: Around the mouth and armpits
Total Square Area In Cm2 (Optional): 1100
Consent: Written consent obtained, risks reviewed including but not limited to crusting, scabbing, blistering, scarring, darker or lighter pigmentary change, incidental hair removal, bruising, and/or incomplete removal.
Fluence #2 (J/Cm2 Or Mj/Cm2): 350
Mode: tile
Fluence Units: J/cm2
Spot Size: 2 x 2 cm
Post-Care Instructions: I reviewed with the patient in detail post-care instructions. Patient should stay away from the sun and wear sun protection until treated areas are fully healed.
Detail Level: Detailed
Location #1: Hands and fingers
Billin (Total area greater than 500 cm2)
Treatment Number: 59
Location #3: Upper chest/back

## 2022-01-25 ENCOUNTER — APPOINTMENT (RX ONLY)
Dept: URBAN - METROPOLITAN AREA CLINIC 4 | Facility: CLINIC | Age: 77
Setting detail: DERMATOLOGY
End: 2022-01-25

## 2022-01-25 PROBLEM — L80 VITILIGO: Status: ACTIVE | Noted: 2022-01-25

## 2022-01-25 PROCEDURE — ? ADDITIONAL NOTES

## 2022-01-25 PROCEDURE — 96922 EXCIMER LSR PSRIASIS>500SQCM: CPT

## 2022-01-25 PROCEDURE — ? EXCIMER LASER

## 2022-01-25 NOTE — PROCEDURE: EXCIMER LASER
Location #2: Around the mouth and armpits
Total Square Area In Cm2 (Optional): 1114
Consent: Written consent obtained, risks reviewed including but not limited to crusting, scabbing, blistering, scarring, darker or lighter pigmentary change, incidental hair removal, bruising, and/or incomplete removal.
Fluence #1 (J/Cm2 Or Mj/Cm2): 1100
Fluence #2 (J/Cm2 Or Mj/Cm2): 350
Mode: tile
Fluence Units: J/cm2
Spot Size: 2 x 2 cm
Post-Care Instructions: I reviewed with the patient in detail post-care instructions. Patient should stay away from the sun and wear sun protection until treated areas are fully healed.
Detail Level: Detailed
Location #1: Hands and fingers
Billin (Total area greater than 500 cm2)
Treatment Number: 60
Location #3: Upper chest/back

## 2022-01-28 ENCOUNTER — APPOINTMENT (RX ONLY)
Dept: URBAN - METROPOLITAN AREA CLINIC 4 | Facility: CLINIC | Age: 77
Setting detail: DERMATOLOGY
End: 2022-01-28

## 2022-01-28 PROBLEM — L80 VITILIGO: Status: ACTIVE | Noted: 2022-01-28

## 2022-01-28 PROCEDURE — 96922 EXCIMER LSR PSRIASIS>500SQCM: CPT

## 2022-01-28 PROCEDURE — ? EXCIMER LASER

## 2022-01-28 PROCEDURE — ? ADDITIONAL NOTES

## 2022-01-28 NOTE — PROCEDURE: EXCIMER LASER
Location #2: Around the mouth and armpits
Total Square Area In Cm2 (Optional): 1096
Consent: Written consent obtained, risks reviewed including but not limited to crusting, scabbing, blistering, scarring, darker or lighter pigmentary change, incidental hair removal, bruising, and/or incomplete removal.
Fluence #1 (J/Cm2 Or Mj/Cm2): 1100
Fluence #2 (J/Cm2 Or Mj/Cm2): 350
Mode: tile
Fluence Units: J/cm2
Spot Size: 2 x 2 cm
Post-Care Instructions: I reviewed with the patient in detail post-care instructions. Patient should stay away from the sun and wear sun protection until treated areas are fully healed.
Detail Level: Detailed
Location #1: Hands and fingers
Billin (Total area greater than 500 cm2)
Treatment Number: 61
Location #3: Upper chest/back

## 2022-02-01 ENCOUNTER — APPOINTMENT (RX ONLY)
Dept: URBAN - METROPOLITAN AREA CLINIC 4 | Facility: CLINIC | Age: 77
Setting detail: DERMATOLOGY
End: 2022-02-01

## 2022-02-01 PROBLEM — L80 VITILIGO: Status: ACTIVE | Noted: 2022-02-01

## 2022-02-01 PROCEDURE — ? EXCIMER LASER

## 2022-02-01 PROCEDURE — ? ADDITIONAL NOTES

## 2022-02-01 PROCEDURE — 96922 EXCIMER LSR PSRIASIS>500SQCM: CPT

## 2022-02-01 NOTE — PROCEDURE: EXCIMER LASER
Location #2: Around the mouth and armpits
Total Square Area In Cm2 (Optional): 1089
Consent: Written consent obtained, risks reviewed including but not limited to crusting, scabbing, blistering, scarring, darker or lighter pigmentary change, incidental hair removal, bruising, and/or incomplete removal.
Fluence #1 (J/Cm2 Or Mj/Cm2): 1100
Fluence #2 (J/Cm2 Or Mj/Cm2): 350
Mode: tile
Fluence Units: J/cm2
Spot Size: 2 x 2 cm
Post-Care Instructions: I reviewed with the patient in detail post-care instructions. Patient should stay away from the sun and wear sun protection until treated areas are fully healed.
Detail Level: Detailed
Location #1: Hands and fingers
Billin (Total area greater than 500 cm2)
Treatment Number: 62
Location #3: Upper chest/back

## 2022-02-04 ENCOUNTER — APPOINTMENT (RX ONLY)
Dept: URBAN - METROPOLITAN AREA CLINIC 4 | Facility: CLINIC | Age: 77
Setting detail: DERMATOLOGY
End: 2022-02-04

## 2022-02-04 PROBLEM — L80 VITILIGO: Status: ACTIVE | Noted: 2022-02-04

## 2022-02-04 PROCEDURE — 96922 EXCIMER LSR PSRIASIS>500SQCM: CPT

## 2022-02-04 PROCEDURE — ? ADDITIONAL NOTES

## 2022-02-04 PROCEDURE — ? EXCIMER LASER

## 2022-02-04 NOTE — PROCEDURE: EXCIMER LASER
Location #2: Around the mouth and armpits
Total Square Area In Cm2 (Optional): 4567
Consent: Written consent obtained, risks reviewed including but not limited to crusting, scabbing, blistering, scarring, darker or lighter pigmentary change, incidental hair removal, bruising, and/or incomplete removal.
Fluence #1 (J/Cm2 Or Mj/Cm2): 3573
Fluence #2 (J/Cm2 Or Mj/Cm2): 350
Mode: tile
Fluence Units: J/cm2
Spot Size: 2 x 2 cm
Post-Care Instructions: I reviewed with the patient in detail post-care instructions. Patient should stay away from the sun and wear sun protection until treated areas are fully healed.
Detail Level: Detailed
Location #1: Hands and fingers
Billin (Total area greater than 500 cm2)
Treatment Number: 63
Location #3: Upper chest/back

## 2022-02-08 ENCOUNTER — APPOINTMENT (RX ONLY)
Dept: URBAN - METROPOLITAN AREA CLINIC 4 | Facility: CLINIC | Age: 77
Setting detail: DERMATOLOGY
End: 2022-02-08

## 2022-02-08 PROBLEM — L80 VITILIGO: Status: ACTIVE | Noted: 2022-02-08

## 2022-02-08 PROCEDURE — ? ADDITIONAL NOTES

## 2022-02-08 PROCEDURE — ? EXCIMER LASER

## 2022-02-08 PROCEDURE — 96922 EXCIMER LSR PSRIASIS>500SQCM: CPT

## 2022-02-08 NOTE — PROCEDURE: EXCIMER LASER
Location #2: Around the mouth and armpits
Total Square Area In Cm2 (Optional): 2719
Consent: Written consent obtained, risks reviewed including but not limited to crusting, scabbing, blistering, scarring, darker or lighter pigmentary change, incidental hair removal, bruising, and/or incomplete removal.
Fluence #1 (J/Cm2 Or Mj/Cm2): 7935
Fluence #2 (J/Cm2 Or Mj/Cm2): 350
Mode: tile
Fluence Units: J/cm2
Spot Size: 2 x 2 cm
Post-Care Instructions: I reviewed with the patient in detail post-care instructions. Patient should stay away from the sun and wear sun protection until treated areas are fully healed.
Detail Level: Detailed
Location #1: Hands and fingers
Billin (Total area greater than 500 cm2)
Treatment Number: 64
Location #3: Upper chest/back

## 2022-02-11 ENCOUNTER — APPOINTMENT (RX ONLY)
Dept: URBAN - METROPOLITAN AREA CLINIC 4 | Facility: CLINIC | Age: 77
Setting detail: DERMATOLOGY
End: 2022-02-11

## 2022-02-11 PROBLEM — L80 VITILIGO: Status: ACTIVE | Noted: 2022-02-11

## 2022-02-11 PROCEDURE — ? EXCIMER LASER

## 2022-02-11 PROCEDURE — 96922 EXCIMER LSR PSRIASIS>500SQCM: CPT

## 2022-02-11 PROCEDURE — ? ADDITIONAL NOTES

## 2022-02-11 NOTE — PROCEDURE: EXCIMER LASER
Location #2: Around the mouth and armpits
Total Square Area In Cm2 (Optional): 1173
Consent: Written consent obtained, risks reviewed including but not limited to crusting, scabbing, blistering, scarring, darker or lighter pigmentary change, incidental hair removal, bruising, and/or incomplete removal.
Fluence #1 (J/Cm2 Or Mj/Cm2): 0106
Fluence #2 (J/Cm2 Or Mj/Cm2): 350
Mode: tile
Fluence Units: J/cm2
Spot Size: 2 x 2 cm
Post-Care Instructions: I reviewed with the patient in detail post-care instructions. Patient should stay away from the sun and wear sun protection until treated areas are fully healed.
Detail Level: Detailed
Location #1: Hands and fingers
Billin (Total area greater than 500 cm2)
Treatment Number: 65
Location #3: Upper chest/back

## 2022-02-15 ENCOUNTER — APPOINTMENT (RX ONLY)
Dept: URBAN - METROPOLITAN AREA CLINIC 4 | Facility: CLINIC | Age: 77
Setting detail: DERMATOLOGY
End: 2022-02-15

## 2022-02-15 PROBLEM — L80 VITILIGO: Status: ACTIVE | Noted: 2022-02-15

## 2022-02-15 PROCEDURE — ? ADDITIONAL NOTES

## 2022-02-15 PROCEDURE — ? EXCIMER LASER

## 2022-02-15 PROCEDURE — 96922 EXCIMER LSR PSRIASIS>500SQCM: CPT

## 2022-02-15 NOTE — PROCEDURE: EXCIMER LASER
Location #2: Around the mouth and armpits
Total Square Area In Cm2 (Optional): 4653
Consent: Written consent obtained, risks reviewed including but not limited to crusting, scabbing, blistering, scarring, darker or lighter pigmentary change, incidental hair removal, bruising, and/or incomplete removal.
Fluence #1 (J/Cm2 Or Mj/Cm2): 7712
Fluence #2 (J/Cm2 Or Mj/Cm2): 350
Mode: tile
Fluence Units: J/cm2
Spot Size: 2 x 2 cm
Post-Care Instructions: I reviewed with the patient in detail post-care instructions. Patient should stay away from the sun and wear sun protection until treated areas are fully healed.
Detail Level: Detailed
Location #1: Hands and fingers
Billin (Total area greater than 500 cm2)
Treatment Number: 66
Location #3: Upper chest/back

## 2022-02-18 ENCOUNTER — APPOINTMENT (RX ONLY)
Dept: URBAN - METROPOLITAN AREA CLINIC 4 | Facility: CLINIC | Age: 77
Setting detail: DERMATOLOGY
End: 2022-02-18

## 2022-02-18 PROBLEM — L80 VITILIGO: Status: ACTIVE | Noted: 2022-02-18

## 2022-02-18 PROCEDURE — ? EXCIMER LASER

## 2022-02-18 PROCEDURE — ? ADDITIONAL NOTES

## 2022-02-18 PROCEDURE — 96922 EXCIMER LSR PSRIASIS>500SQCM: CPT

## 2022-02-18 NOTE — PROCEDURE: EXCIMER LASER
Location #2: Around the mouth and armpits
Total Square Area In Cm2 (Optional): 1098
Consent: Written consent obtained, risks reviewed including but not limited to crusting, scabbing, blistering, scarring, darker or lighter pigmentary change, incidental hair removal, bruising, and/or incomplete removal.
Fluence #1 (J/Cm2 Or Mj/Cm2): 1200
Fluence #2 (J/Cm2 Or Mj/Cm2): 400
Mode: tile
Fluence Units: J/cm2
Spot Size: 2 x 2 cm
Post-Care Instructions: I reviewed with the patient in detail post-care instructions. Patient should stay away from the sun and wear sun protection until treated areas are fully healed.
Detail Level: Detailed
Location #1: Hands and fingers
Billin (Total area greater than 500 cm2)
Treatment Number: 67
Location #3: Upper chest/back

## 2022-02-22 ENCOUNTER — APPOINTMENT (RX ONLY)
Dept: URBAN - METROPOLITAN AREA CLINIC 4 | Facility: CLINIC | Age: 77
Setting detail: DERMATOLOGY
End: 2022-02-22

## 2022-02-22 PROBLEM — L80 VITILIGO: Status: ACTIVE | Noted: 2022-02-22

## 2022-02-22 PROCEDURE — ? EXCIMER LASER

## 2022-02-22 PROCEDURE — ? ADDITIONAL NOTES

## 2022-02-22 PROCEDURE — 96922 EXCIMER LSR PSRIASIS>500SQCM: CPT

## 2022-02-22 NOTE — PROCEDURE: EXCIMER LASER
Location #2: Around the mouth and armpits
Total Square Area In Cm2 (Optional): 5185
Consent: Written consent obtained, risks reviewed including but not limited to crusting, scabbing, blistering, scarring, darker or lighter pigmentary change, incidental hair removal, bruising, and/or incomplete removal.
Fluence #1 (J/Cm2 Or Mj/Cm2): 1254
Fluence #2 (J/Cm2 Or Mj/Cm2): 450
Mode: tile
Fluence Units: J/cm2
Spot Size: 2 x 2 cm
Post-Care Instructions: I reviewed with the patient in detail post-care instructions. Patient should stay away from the sun and wear sun protection until treated areas are fully healed.
Detail Level: Detailed
Location #1: Hands and fingers
Billin (Total area greater than 500 cm2)
Treatment Number: 69
Location #3: Upper chest/back

## 2022-02-25 ENCOUNTER — APPOINTMENT (RX ONLY)
Dept: URBAN - METROPOLITAN AREA CLINIC 4 | Facility: CLINIC | Age: 77
Setting detail: DERMATOLOGY
End: 2022-02-25

## 2022-02-25 PROBLEM — L80 VITILIGO: Status: ACTIVE | Noted: 2022-02-25

## 2022-02-25 PROCEDURE — 96922 EXCIMER LSR PSRIASIS>500SQCM: CPT

## 2022-02-25 PROCEDURE — ? ADDITIONAL NOTES

## 2022-02-25 PROCEDURE — ? EXCIMER LASER

## 2022-02-25 NOTE — PROCEDURE: EXCIMER LASER
Location #2: Around the mouth and armpits
Total Square Area In Cm2 (Optional): 816
Consent: Written consent obtained, risks reviewed including but not limited to crusting, scabbing, blistering, scarring, darker or lighter pigmentary change, incidental hair removal, bruising, and/or incomplete removal.
Fluence #1 (J/Cm2 Or Mj/Cm2): 1254
Fluence #2 (J/Cm2 Or Mj/Cm2): 450
Mode: tile
Fluence Units: J/cm2
Spot Size: 2 x 2 cm
Post-Care Instructions: I reviewed with the patient in detail post-care instructions. Patient should stay away from the sun and wear sun protection until treated areas are fully healed.
Detail Level: Detailed
Location #1: Hands and fingers
Billin (Total area greater than 500 cm2)
Treatment Number: 70
Location #3: Upper chest/back

## 2022-03-01 ENCOUNTER — APPOINTMENT (RX ONLY)
Dept: URBAN - METROPOLITAN AREA CLINIC 4 | Facility: CLINIC | Age: 77
Setting detail: DERMATOLOGY
End: 2022-03-01

## 2022-03-01 PROBLEM — L80 VITILIGO: Status: ACTIVE | Noted: 2022-03-01

## 2022-03-01 PROCEDURE — ? ADDITIONAL NOTES

## 2022-03-01 PROCEDURE — 96922 EXCIMER LSR PSRIASIS>500SQCM: CPT

## 2022-03-01 PROCEDURE — ? EXCIMER LASER

## 2022-03-01 NOTE — PROCEDURE: EXCIMER LASER
Location #2: Around the mouth and armpits
Total Square Area In Cm2 (Optional): 773
Consent: Written consent obtained, risks reviewed including but not limited to crusting, scabbing, blistering, scarring, darker or lighter pigmentary change, incidental hair removal, bruising, and/or incomplete removal.
Fluence #1 (J/Cm2 Or Mj/Cm2): 1253
Fluence #2 (J/Cm2 Or Mj/Cm2): 450
Mode: tile
Fluence Units: J/cm2
Spot Size: 2 x 2 cm
Post-Care Instructions: I reviewed with the patient in detail post-care instructions. Patient should stay away from the sun and wear sun protection until treated areas are fully healed.
Detail Level: Detailed
Location #1: Hands and fingers
Billin (Total area greater than 500 cm2)
Treatment Number: 71
Location #3: Upper chest/back

## 2022-03-04 ENCOUNTER — APPOINTMENT (RX ONLY)
Dept: URBAN - METROPOLITAN AREA CLINIC 4 | Facility: CLINIC | Age: 77
Setting detail: DERMATOLOGY
End: 2022-03-04

## 2022-03-04 PROBLEM — L80 VITILIGO: Status: ACTIVE | Noted: 2022-03-04

## 2022-03-04 PROCEDURE — 96922 EXCIMER LSR PSRIASIS>500SQCM: CPT

## 2022-03-04 PROCEDURE — ? EXCIMER LASER

## 2022-03-04 PROCEDURE — ? ADDITIONAL NOTES

## 2022-03-08 ENCOUNTER — APPOINTMENT (RX ONLY)
Dept: URBAN - METROPOLITAN AREA CLINIC 4 | Facility: CLINIC | Age: 77
Setting detail: DERMATOLOGY
End: 2022-03-08

## 2022-03-08 PROBLEM — L80 VITILIGO: Status: ACTIVE | Noted: 2022-03-08

## 2022-03-08 PROCEDURE — ? EXCIMER LASER

## 2022-03-08 PROCEDURE — ? ADDITIONAL NOTES

## 2022-03-08 PROCEDURE — 96922 EXCIMER LSR PSRIASIS>500SQCM: CPT

## 2022-03-08 NOTE — PROCEDURE: EXCIMER LASER
Location #2: Around the mouth and armpits
Total Square Area In Cm2 (Optional): 0231
Consent: Written consent obtained, risks reviewed including but not limited to crusting, scabbing, blistering, scarring, darker or lighter pigmentary change, incidental hair removal, bruising, and/or incomplete removal.
Fluence #1 (J/Cm2 Or Mj/Cm2): 1200
Fluence #2 (J/Cm2 Or Mj/Cm2): 450
Mode: tile
Fluence Units: J/cm2
Spot Size: 2 x 2 cm
Post-Care Instructions: I reviewed with the patient in detail post-care instructions. Patient should stay away from the sun and wear sun protection until treated areas are fully healed.
Detail Level: Detailed
Location #1: Hands and fingers
Billin (Total area greater than 500 cm2)
Treatment Number: 73
Location #3: Upper chest/back

## 2022-03-11 ENCOUNTER — APPOINTMENT (RX ONLY)
Dept: URBAN - METROPOLITAN AREA CLINIC 4 | Facility: CLINIC | Age: 77
Setting detail: DERMATOLOGY
End: 2022-03-11

## 2022-03-11 DIAGNOSIS — Z79.899 OTHER LONG TERM (CURRENT) DRUG THERAPY: ICD-10-CM

## 2022-03-11 DIAGNOSIS — L80 VITILIGO: ICD-10-CM

## 2022-03-11 PROCEDURE — ? ADDITIONAL NOTES

## 2022-03-11 PROCEDURE — ? ORDER TESTS

## 2022-03-11 PROCEDURE — 99213 OFFICE O/P EST LOW 20 MIN: CPT

## 2022-03-11 PROCEDURE — ? COUNSELING

## 2022-03-11 ASSESSMENT — LOCATION SIMPLE DESCRIPTION DERM
LOCATION SIMPLE: POSTERIOR NECK
LOCATION SIMPLE: RIGHT HAND
LOCATION SIMPLE: LEFT HAND

## 2022-03-11 ASSESSMENT — LOCATION ZONE DERM
LOCATION ZONE: HAND
LOCATION ZONE: NECK

## 2022-03-11 ASSESSMENT — LOCATION DETAILED DESCRIPTION DERM
LOCATION DETAILED: LEFT ULNAR DORSAL HAND
LOCATION DETAILED: RIGHT MEDIAL TRAPEZIAL NECK
LOCATION DETAILED: RIGHT RADIAL DORSAL HAND

## 2022-03-11 NOTE — PROCEDURE: ORDER TESTS
Bill For Surgical Tray: no
Expected Date Of Service: 03/11/2022
Performing Laboratory: 0
Billing Type: Third-Party Bill

## 2022-03-11 NOTE — PROCEDURE: ADDITIONAL NOTES
Detail Level: Zone
Additional Notes: Recommend a non steroid ointment such as tacrolimus or Elidel.\\nDiscussed jacquie inhibitors and the risks taking them.\\nRecommend continuing excimer.
Render Risk Assessment In Note?: no
Additional Notes: Stop taking biotin for a week before getting labs done.

## 2022-03-11 NOTE — PROCEDURE: REASSURANCE
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
Detail Level: Detailed
Hide Additional Notes?: No

## 2022-03-15 ENCOUNTER — APPOINTMENT (RX ONLY)
Dept: URBAN - METROPOLITAN AREA CLINIC 4 | Facility: CLINIC | Age: 77
Setting detail: DERMATOLOGY
End: 2022-03-15

## 2022-03-15 PROBLEM — L80 VITILIGO: Status: ACTIVE | Noted: 2022-03-15

## 2022-03-15 PROCEDURE — ? ADDITIONAL NOTES

## 2022-03-15 PROCEDURE — ? EXCIMER LASER

## 2022-03-15 PROCEDURE — 96922 EXCIMER LSR PSRIASIS>500SQCM: CPT

## 2022-03-15 NOTE — PROCEDURE: EXCIMER LASER
Location #2: Around the mouth and armpits
Total Square Area In Cm2 (Optional): 1100
Consent: Written consent obtained, risks reviewed including but not limited to crusting, scabbing, blistering, scarring, darker or lighter pigmentary change, incidental hair removal, bruising, and/or incomplete removal.
Fluence #1 (J/Cm2 Or Mj/Cm2): 1200
Fluence #2 (J/Cm2 Or Mj/Cm2): 450
Mode: tile
Fluence Units: J/cm2
Spot Size: 2 x 2 cm
Post-Care Instructions: I reviewed with the patient in detail post-care instructions. Patient should stay away from the sun and wear sun protection until treated areas are fully healed.
Detail Level: Detailed
Location #1: Hands and fingers
Billin (Total area greater than 500 cm2)
Treatment Number: 74
Location #3: Upper chest/back

## 2022-03-22 ENCOUNTER — APPOINTMENT (RX ONLY)
Dept: URBAN - METROPOLITAN AREA CLINIC 4 | Facility: CLINIC | Age: 77
Setting detail: DERMATOLOGY
End: 2022-03-22

## 2022-03-22 PROBLEM — L80 VITILIGO: Status: ACTIVE | Noted: 2022-03-22

## 2022-03-22 PROCEDURE — 96922 EXCIMER LSR PSRIASIS>500SQCM: CPT

## 2022-03-22 PROCEDURE — ? EXCIMER LASER

## 2022-03-22 PROCEDURE — ? ADDITIONAL NOTES

## 2022-03-22 NOTE — PROCEDURE: EXCIMER LASER
Location #2: Around the mouth and armpits
Total Square Area In Cm2 (Optional): 1207
Consent: Written consent obtained, risks reviewed including but not limited to crusting, scabbing, blistering, scarring, darker or lighter pigmentary change, incidental hair removal, bruising, and/or incomplete removal.
Fluence #1 (J/Cm2 Or Mj/Cm2): 1200
Fluence #2 (J/Cm2 Or Mj/Cm2): 450
Mode: tile
Fluence Units: J/cm2
Spot Size: 2 x 2 cm
Post-Care Instructions: I reviewed with the patient in detail post-care instructions. Patient should stay away from the sun and wear sun protection until treated areas are fully healed.
Detail Level: Detailed
Location #1: Hands and fingers
Billin (Total area greater than 500 cm2)
Treatment Number: 75
Location #3: Upper chest/back

## 2022-03-25 ENCOUNTER — APPOINTMENT (RX ONLY)
Dept: URBAN - METROPOLITAN AREA CLINIC 4 | Facility: CLINIC | Age: 77
Setting detail: DERMATOLOGY
End: 2022-03-25

## 2022-03-25 PROBLEM — L80 VITILIGO: Status: ACTIVE | Noted: 2022-03-25

## 2022-03-25 PROCEDURE — ? EXCIMER LASER

## 2022-03-25 PROCEDURE — 96922 EXCIMER LSR PSRIASIS>500SQCM: CPT

## 2022-03-25 NOTE — PROCEDURE: EXCIMER LASER
Location #2: Around the mouth and armpits
Total Square Area In Cm2 (Optional): 7778
Consent: Written consent obtained, risks reviewed including but not limited to crusting, scabbing, blistering, scarring, darker or lighter pigmentary change, incidental hair removal, bruising, and/or incomplete removal.
Fluence #1 (J/Cm2 Or Mj/Cm2): 1200
Fluence #2 (J/Cm2 Or Mj/Cm2): 450
Mode: tile
Fluence Units: J/cm2
Spot Size: 2 x 2 cm
Post-Care Instructions: I reviewed with the patient in detail post-care instructions. Patient should stay away from the sun and wear sun protection until treated areas are fully healed.
Detail Level: Detailed
Location #1: Hands and fingers
Billin (Total area greater than 500 cm2)
Treatment Number: 76
Location #3: Upper chest/back

## 2022-03-29 ENCOUNTER — APPOINTMENT (RX ONLY)
Dept: URBAN - METROPOLITAN AREA CLINIC 4 | Facility: CLINIC | Age: 77
Setting detail: DERMATOLOGY
End: 2022-03-29

## 2022-03-29 PROBLEM — L80 VITILIGO: Status: ACTIVE | Noted: 2022-03-29

## 2022-03-29 PROCEDURE — ? EXCIMER LASER

## 2022-03-29 PROCEDURE — 96922 EXCIMER LSR PSRIASIS>500SQCM: CPT

## 2022-03-29 NOTE — PROCEDURE: EXCIMER LASER
Location #2: Around the mouth and armpits
Total Square Area In Cm2 (Optional): 5621
Consent: Written consent obtained, risks reviewed including but not limited to crusting, scabbing, blistering, scarring, darker or lighter pigmentary change, incidental hair removal, bruising, and/or incomplete removal.
Fluence #1 (J/Cm2 Or Mj/Cm2): 1200
Fluence #2 (J/Cm2 Or Mj/Cm2): 450
Mode: tile
Fluence Units: J/cm2
Spot Size: 2 x 2 cm
Post-Care Instructions: I reviewed with the patient in detail post-care instructions. Patient should stay away from the sun and wear sun protection until treated areas are fully healed.
Detail Level: Detailed
Location #1: Hands and fingers
Billin (Total area greater than 500 cm2)
Treatment Number: 77
Location #3: Upper chest/back

## 2022-04-05 ENCOUNTER — APPOINTMENT (RX ONLY)
Dept: URBAN - METROPOLITAN AREA CLINIC 4 | Facility: CLINIC | Age: 77
Setting detail: DERMATOLOGY
End: 2022-04-05

## 2022-04-05 PROBLEM — L80 VITILIGO: Status: ACTIVE | Noted: 2022-04-05

## 2022-04-05 PROCEDURE — ? EXCIMER LASER

## 2022-04-05 PROCEDURE — 96922 EXCIMER LSR PSRIASIS>500SQCM: CPT

## 2022-04-05 NOTE — PROCEDURE: EXCIMER LASER
Location #2: Around the mouth and armpits
Total Square Area In Cm2 (Optional): 4444
Consent: Written consent obtained, risks reviewed including but not limited to crusting, scabbing, blistering, scarring, darker or lighter pigmentary change, incidental hair removal, bruising, and/or incomplete removal.
Fluence #1 (J/Cm2 Or Mj/Cm2): 125
Fluence #2 (J/Cm2 Or Mj/Cm2): 500
Mode: tile
Fluence Units: J/cm2
Spot Size: 2 x 2 cm
Post-Care Instructions: I reviewed with the patient in detail post-care instructions. Patient should stay away from the sun and wear sun protection until treated areas are fully healed.
Detail Level: Detailed
Location #1: Hands and fingers
Billin (Total area greater than 500 cm2)
Treatment Number: 78
Location #3: Upper chest/back
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
## 2023-05-26 ENCOUNTER — APPOINTMENT (RX ONLY)
Dept: URBAN - METROPOLITAN AREA CLINIC 4 | Facility: CLINIC | Age: 78
Setting detail: DERMATOLOGY
End: 2023-05-26

## 2023-05-26 DIAGNOSIS — L82.1 OTHER SEBORRHEIC KERATOSIS: ICD-10-CM

## 2023-05-26 DIAGNOSIS — D18.0 HEMANGIOMA: ICD-10-CM

## 2023-05-26 DIAGNOSIS — L81.4 OTHER MELANIN HYPERPIGMENTATION: ICD-10-CM

## 2023-05-26 DIAGNOSIS — Z71.89 OTHER SPECIFIED COUNSELING: ICD-10-CM

## 2023-05-26 DIAGNOSIS — Z85.828 PERSONAL HISTORY OF OTHER MALIGNANT NEOPLASM OF SKIN: ICD-10-CM

## 2023-05-26 DIAGNOSIS — L80 VITILIGO: ICD-10-CM

## 2023-05-26 DIAGNOSIS — D22 MELANOCYTIC NEVI: ICD-10-CM

## 2023-05-26 PROBLEM — D18.01 HEMANGIOMA OF SKIN AND SUBCUTANEOUS TISSUE: Status: ACTIVE | Noted: 2023-05-26

## 2023-05-26 PROBLEM — D22.5 MELANOCYTIC NEVI OF TRUNK: Status: ACTIVE | Noted: 2023-05-26

## 2023-05-26 PROCEDURE — ? COUNSELING

## 2023-05-26 PROCEDURE — ? SUNSCREEN RECOMMENDATIONS

## 2023-05-26 PROCEDURE — ? PRESCRIPTION

## 2023-05-26 PROCEDURE — 99213 OFFICE O/P EST LOW 20 MIN: CPT

## 2023-05-26 PROCEDURE — ? PRESCRIPTION SAMPLES PROVIDED

## 2023-05-26 RX ORDER — RUXOLITINIB 15 MG/G
CREAM TOPICAL
Qty: 60 | Refills: 1 | Status: ERX | COMMUNITY
Start: 2023-05-26

## 2023-05-26 RX ADMIN — RUXOLITINIB: 15 CREAM TOPICAL at 00:00

## 2023-05-26 ASSESSMENT — LOCATION DETAILED DESCRIPTION DERM
LOCATION DETAILED: EPIGASTRIC SKIN
LOCATION DETAILED: RIGHT INFERIOR UPPER BACK
LOCATION DETAILED: RIGHT SUPERIOR UPPER BACK
LOCATION DETAILED: LEFT PROXIMAL DORSAL FOREARM
LOCATION DETAILED: RIGHT LOWER CUTANEOUS LIP
LOCATION DETAILED: UPPER STERNUM
LOCATION DETAILED: RIGHT CLAVICULAR NECK
LOCATION DETAILED: RIGHT DISTAL DORSAL FOREARM
LOCATION DETAILED: RIGHT RADIAL DORSAL HAND
LOCATION DETAILED: INFERIOR THORACIC SPINE
LOCATION DETAILED: LEFT LOWER CUTANEOUS LIP
LOCATION DETAILED: LEFT CLAVICULAR SKIN
LOCATION DETAILED: LEFT SUPERIOR UPPER BACK
LOCATION DETAILED: RIGHT PROXIMAL DORSAL FOREARM
LOCATION DETAILED: LEFT RADIAL DORSAL HAND

## 2023-05-26 ASSESSMENT — LOCATION SIMPLE DESCRIPTION DERM
LOCATION SIMPLE: RIGHT LIP
LOCATION SIMPLE: ABDOMEN
LOCATION SIMPLE: UPPER BACK
LOCATION SIMPLE: RIGHT HAND
LOCATION SIMPLE: RIGHT UPPER BACK
LOCATION SIMPLE: LEFT HAND
LOCATION SIMPLE: LEFT LIP
LOCATION SIMPLE: RIGHT FOREARM
LOCATION SIMPLE: RIGHT ANTERIOR NECK
LOCATION SIMPLE: LEFT CLAVICULAR SKIN
LOCATION SIMPLE: LEFT UPPER BACK
LOCATION SIMPLE: LEFT FOREARM
LOCATION SIMPLE: CHEST

## 2023-05-26 ASSESSMENT — LOCATION ZONE DERM
LOCATION ZONE: LIP
LOCATION ZONE: HAND
LOCATION ZONE: TRUNK
LOCATION ZONE: ARM
LOCATION ZONE: NECK

## 2023-05-26 NOTE — HPI: FULL BODY SKIN EXAMINATION
How Severe Are Your Spot(S)?: mild
What Type Of Note Output Would You Prefer (Optional)?: Standard Output
What Is The Reason For Today's Visit?: Full Body Skin Examination with No Concerns
What Is The Reason For Today's Visit? (Being Monitored For X): concerning skin lesions on an annual basis
Additional History: Pt has not noticed any new or changing moles. No tender or bleeding spots

## 2024-05-14 ENCOUNTER — HOSPITAL ENCOUNTER (OUTPATIENT)
Facility: MEDICAL CENTER | Age: 79
End: 2024-05-14
Attending: OTOLARYNGOLOGY | Admitting: OTOLARYNGOLOGY
Payer: MEDICARE

## 2024-05-20 ENCOUNTER — APPOINTMENT (OUTPATIENT)
Dept: ADMISSIONS | Facility: MEDICAL CENTER | Age: 79
End: 2024-05-20
Payer: MEDICARE

## 2024-07-18 ENCOUNTER — OFFICE VISIT (OUTPATIENT)
Dept: NEUROLOGY | Facility: MEDICAL CENTER | Age: 79
End: 2024-07-18
Attending: PSYCHIATRY & NEUROLOGY
Payer: MEDICARE

## 2024-07-18 VITALS
TEMPERATURE: 99.9 F | RESPIRATION RATE: 16 BRPM | BODY MASS INDEX: 21.57 KG/M2 | SYSTOLIC BLOOD PRESSURE: 122 MMHG | OXYGEN SATURATION: 95 % | WEIGHT: 126.32 LBS | DIASTOLIC BLOOD PRESSURE: 80 MMHG | HEIGHT: 64 IN | HEART RATE: 98 BPM

## 2024-07-18 DIAGNOSIS — G70.00 MYASTHENIA GRAVIS (HCC): Primary | ICD-10-CM

## 2024-07-18 DIAGNOSIS — G47.33 OSA (OBSTRUCTIVE SLEEP APNEA): ICD-10-CM

## 2024-07-18 PROCEDURE — G2212 PROLONG OUTPT/OFFICE VIS: HCPCS | Performed by: PSYCHIATRY & NEUROLOGY

## 2024-07-18 PROCEDURE — 3074F SYST BP LT 130 MM HG: CPT | Performed by: PSYCHIATRY & NEUROLOGY

## 2024-07-18 PROCEDURE — 3079F DIAST BP 80-89 MM HG: CPT | Performed by: PSYCHIATRY & NEUROLOGY

## 2024-07-18 PROCEDURE — 99205 OFFICE O/P NEW HI 60 MIN: CPT | Performed by: PSYCHIATRY & NEUROLOGY

## 2024-07-18 RX ORDER — MONTELUKAST SODIUM 10 MG/1
1 TABLET ORAL DAILY
COMMUNITY
End: 2024-07-18

## 2024-07-18 RX ORDER — HYDROCHLOROTHIAZIDE 12.5 MG/1
TABLET ORAL
COMMUNITY

## 2024-07-18 RX ORDER — PREDNISONE 5 MG/1
10 TABLET ORAL DAILY
Qty: 60 TABLET | Refills: 5 | Status: SHIPPED | OUTPATIENT
Start: 2024-07-18

## 2024-07-18 RX ORDER — PREDNISONE 5 MG/1
TABLET ORAL
COMMUNITY
End: 2024-07-18 | Stop reason: SDUPTHER

## 2024-07-18 RX ORDER — LOSARTAN POTASSIUM 50 MG/1
1 TABLET ORAL EVERY EVENING
COMMUNITY
Start: 2024-06-18

## 2024-07-18 RX ORDER — GLYCOPYRROLATE 1 MG/1
TABLET ORAL
COMMUNITY
Start: 2024-07-16

## 2024-07-18 RX ORDER — CLOBETASOL PROPIONATE 0.5 MG/G
CREAM TOPICAL
COMMUNITY

## 2024-07-18 RX ORDER — PYRIDOSTIGMINE BROMIDE 60 MG/1
TABLET ORAL
COMMUNITY
End: 2024-07-18 | Stop reason: SDUPTHER

## 2024-07-18 RX ORDER — PYRIDOSTIGMINE BROMIDE 60 MG/1
30 TABLET ORAL 4 TIMES DAILY
Qty: 60 TABLET | Refills: 5 | Status: SHIPPED | OUTPATIENT
Start: 2024-07-18

## 2024-07-18 ASSESSMENT — ENCOUNTER SYMPTOMS
TINGLING: 0
DIZZINESS: 1
MEMORY LOSS: 0
NECK PAIN: 0
BACK PAIN: 0
FALLS: 0
DEPRESSION: 0
INSOMNIA: 0
BLURRED VISION: 0
FOCAL WEAKNESS: 0
DOUBLE VISION: 0
SPEECH CHANGE: 0
TREMORS: 0

## 2024-08-01 ENCOUNTER — NON-PROVIDER VISIT (OUTPATIENT)
Dept: CARDIOLOGY | Facility: MEDICAL CENTER | Age: 79
End: 2024-08-01
Attending: FAMILY MEDICINE
Payer: MEDICARE

## 2024-08-21 ENCOUNTER — TELEPHONE (OUTPATIENT)
Dept: CARDIOLOGY | Facility: MEDICAL CENTER | Age: 79
End: 2024-08-21
Payer: MEDICARE

## 2024-08-21 NOTE — TELEPHONE ENCOUNTER
Ilir EOS to BN for review on 8/22/24 as ADD    Preliminary findings:    19 episodes of SVT  with an avg rate f 132 bpm    Sinus rhythm  with an avg rate of 84 bpm    Rare suraentricular ectopy    Rare isolated ventricular ectopy    15 patient events:  SR   SVE(s)

## 2024-09-12 ENCOUNTER — APPOINTMENT (OUTPATIENT)
Dept: RADIOLOGY | Facility: MEDICAL CENTER | Age: 79
End: 2024-09-12
Attending: PSYCHIATRY & NEUROLOGY
Payer: MEDICARE

## 2024-10-07 ENCOUNTER — OFFICE VISIT (OUTPATIENT)
Dept: NEUROLOGY | Facility: MEDICAL CENTER | Age: 79
End: 2024-10-07
Attending: PSYCHIATRY & NEUROLOGY
Payer: MEDICARE

## 2024-10-07 VITALS
SYSTOLIC BLOOD PRESSURE: 130 MMHG | WEIGHT: 129.85 LBS | TEMPERATURE: 97.2 F | BODY MASS INDEX: 22.17 KG/M2 | RESPIRATION RATE: 20 BRPM | HEART RATE: 77 BPM | DIASTOLIC BLOOD PRESSURE: 80 MMHG | HEIGHT: 64 IN | OXYGEN SATURATION: 95 %

## 2024-10-07 DIAGNOSIS — G70.00 MYASTHENIA GRAVIS (HCC): Primary | ICD-10-CM

## 2024-10-07 PROCEDURE — 99215 OFFICE O/P EST HI 40 MIN: CPT | Performed by: PSYCHIATRY & NEUROLOGY

## 2024-10-07 PROCEDURE — 3079F DIAST BP 80-89 MM HG: CPT | Performed by: PSYCHIATRY & NEUROLOGY

## 2024-10-07 PROCEDURE — 99212 OFFICE O/P EST SF 10 MIN: CPT | Performed by: PSYCHIATRY & NEUROLOGY

## 2024-10-07 PROCEDURE — 3074F SYST BP LT 130 MM HG: CPT | Performed by: PSYCHIATRY & NEUROLOGY

## 2024-10-07 RX ORDER — PREDNISONE 5 MG/1
7.5 TABLET ORAL DAILY
Qty: 60 TABLET | Refills: 5 | Status: SHIPPED | OUTPATIENT
Start: 2024-10-07

## 2024-10-07 RX ORDER — OMEGA-3 FATTY ACIDS/FISH OIL 300-1000MG
1000 CAPSULE ORAL 2 TIMES DAILY
COMMUNITY

## 2024-10-07 RX ORDER — PYRIDOSTIGMINE BROMIDE 60 MG/1
30 TABLET ORAL 3 TIMES DAILY
Qty: 135 TABLET | Refills: 3 | Status: SHIPPED | OUTPATIENT
Start: 2024-10-07

## 2024-10-07 ASSESSMENT — ENCOUNTER SYMPTOMS
DOUBLE VISION: 1
PHOTOPHOBIA: 0
DIARRHEA: 1
FOCAL WEAKNESS: 0
WEAKNESS: 0
HEADACHES: 1

## 2024-10-07 ASSESSMENT — PATIENT HEALTH QUESTIONNAIRE - PHQ9: CLINICAL INTERPRETATION OF PHQ2 SCORE: 0

## 2024-11-06 ENCOUNTER — HOSPITAL ENCOUNTER (OUTPATIENT)
Dept: RADIOLOGY | Facility: MEDICAL CENTER | Age: 79
End: 2024-11-06
Attending: PSYCHIATRY & NEUROLOGY
Payer: MEDICARE

## 2024-11-06 DIAGNOSIS — G70.00 MYASTHENIA GRAVIS (HCC): ICD-10-CM

## 2024-11-06 PROCEDURE — 700117 HCHG RX CONTRAST REV CODE 255: Mod: JZ | Performed by: PSYCHIATRY & NEUROLOGY

## 2024-11-06 PROCEDURE — A9579 GAD-BASE MR CONTRAST NOS,1ML: HCPCS | Mod: JZ | Performed by: PSYCHIATRY & NEUROLOGY

## 2024-11-06 PROCEDURE — 71552 MRI CHEST W/O & W/DYE: CPT

## 2024-11-06 RX ADMIN — GADOTERIDOL 10 ML: 279.3 INJECTION, SOLUTION INTRAVENOUS at 10:18

## 2025-03-12 ENCOUNTER — APPOINTMENT (OUTPATIENT)
Dept: NEUROLOGY | Facility: MEDICAL CENTER | Age: 80
End: 2025-03-12
Attending: PSYCHIATRY & NEUROLOGY
Payer: MEDICARE

## 2025-03-19 ENCOUNTER — APPOINTMENT (OUTPATIENT)
Dept: NEUROLOGY | Facility: MEDICAL CENTER | Age: 80
End: 2025-03-19
Attending: PSYCHIATRY & NEUROLOGY
Payer: MEDICARE

## 2025-04-10 NOTE — PROCEDURE: LIQUID NITROGEN
Malnutrition Findings:   Body mass index is 20.11 kg/m².   This is risk factor for wounds and complications with healing.  -recommend formal RD evaluation  
Render Note In Bullet Format When Appropriate: No
Post-Care Instructions: I reviewed with the patient in detail post-care instructions. Patient is to wear sunprotection, and avoid picking at any of the treated lesions. Pt may apply Vaseline to crusted or scabbing areas.
Duration Of Freeze Thaw-Cycle (Seconds): 3
Number Of Freeze-Thaw Cycles: 2 freeze-thaw cycles
Detail Level: Detailed
Consent: The patient's consent was obtained including but not limited to risks of crusting, scabbing, blistering, scarring, darker or lighter pigmentary change, recurrence, incomplete removal and infection.

## 2025-05-08 ENCOUNTER — OFFICE VISIT (OUTPATIENT)
Dept: NEUROLOGY | Facility: MEDICAL CENTER | Age: 80
End: 2025-05-08
Attending: PSYCHIATRY & NEUROLOGY
Payer: MEDICARE

## 2025-05-08 VITALS
RESPIRATION RATE: 16 BRPM | WEIGHT: 135.58 LBS | SYSTOLIC BLOOD PRESSURE: 114 MMHG | DIASTOLIC BLOOD PRESSURE: 72 MMHG | TEMPERATURE: 98.1 F | HEART RATE: 74 BPM | BODY MASS INDEX: 26.62 KG/M2 | OXYGEN SATURATION: 95 % | HEIGHT: 60 IN

## 2025-05-08 DIAGNOSIS — G70.00 MYASTHENIA GRAVIS (HCC): ICD-10-CM

## 2025-05-08 PROCEDURE — 99215 OFFICE O/P EST HI 40 MIN: CPT | Performed by: PSYCHIATRY & NEUROLOGY

## 2025-05-08 PROCEDURE — 3074F SYST BP LT 130 MM HG: CPT | Performed by: PSYCHIATRY & NEUROLOGY

## 2025-05-08 PROCEDURE — 3078F DIAST BP <80 MM HG: CPT | Performed by: PSYCHIATRY & NEUROLOGY

## 2025-05-08 PROCEDURE — 99212 OFFICE O/P EST SF 10 MIN: CPT | Performed by: PSYCHIATRY & NEUROLOGY

## 2025-05-08 RX ORDER — PYRIDOSTIGMINE BROMIDE 60 MG/1
30 TABLET ORAL 3 TIMES DAILY
Qty: 135 TABLET | Refills: 3 | Status: SHIPPED | OUTPATIENT
Start: 2025-05-08

## 2025-05-08 ASSESSMENT — PATIENT HEALTH QUESTIONNAIRE - PHQ9: CLINICAL INTERPRETATION OF PHQ2 SCORE: 0

## 2025-05-08 ASSESSMENT — ENCOUNTER SYMPTOMS
FOCAL WEAKNESS: 0
MEMORY LOSS: 0
SPEECH CHANGE: 0
DEPRESSION: 0
MYALGIAS: 0
DIARRHEA: 0

## 2025-05-08 NOTE — ASSESSMENT & PLAN NOTE
Clinically stable, both of us are happy that she is off prednisone, and given the issues of side effects (a.m. headaches, urinary incontinence, insomnia, cramping, etc.), we would like to avoid that use if we need to go there into the future.  Given her age and the stabilization of her disease, I suspect her risk of relapse is very small.  She will continue Mestinon at its present regimen of dosing since this provides adequate control for symptoms.  She will not likely tolerate further dose reductions, both of us are quite comfortable with her present state of affairs.  She understands symptoms suggestive of disease relapse and crisis, she has call the office in that circumstance.  Because of tolerability issues on prednisone, in the circumstance of increasing symptoms, IVIG probably will be the treatment of choice for her.  Hopefully we will never need to cross that bridge.  We follow-up in 1 year.    Orders:    pyridostigmine (MESTINON) 60 MG Tab; Take 0.5 Tablets by mouth 3 times a day.

## 2025-05-08 NOTE — PROGRESS NOTES
Subjective     Priscilla Alonzo is a 79 y.o. female who presents for annual follow-up, with a history of generalized myasthenia gravis.     CHRIST Wells has been doing incredibly well vis-à-vis her disease.  Now off prednisone over the last 3 weeks, she has felt even better.  She has not of the steroid-induced hypertension, irritability, and sleep distortions.  Energy level overall has stabilized.  Still on Mestinon 30 mg, 3 times daily, there is some wearing-off if she is late with her dosing, the diplopia is usually her tell.  She takes the dose and within less than an hour symptoms improved.  She has never tolerated higher doses of Mestinon because of cramping and diarrhea.    She denies any major problems with speech quality, swallowing, endurance, standing up from a chair, elevating the arms over her head, or ptosis.  Off the prednisone, a lot of the GI symptoms  resolved, especially the diarrhea, her urinary urgency and frequency as well improved, the a.m. headache stopped completely.    Medical, surgical and family histories are reviewed, there are no new drug allergies.  She and her son travel to [place] for a couple of weeks, she was able to keep up with him, 1 single day they climbed more than 35,000 steps.  She is on Mestinon 30 mg 3 times daily, Cozaar, Synthroid, omega-3 fish oil, vitamin D with calcium, HCTZ, Lexapro and Vivelle-DOT patch.    Review of Systems   Constitutional:  Negative for malaise/fatigue.   Gastrointestinal:  Negative for diarrhea.   Genitourinary:  Positive for dysuria. Negative for urgency.   Musculoskeletal:  Negative for myalgias.   Neurological:  Negative for speech change and focal weakness.   Psychiatric/Behavioral:  Negative for depression and memory loss.    All other systems reviewed and are negative.    Objective     /72 (BP Location: Left arm, Patient Position: Sitting, BP Cuff Size: Adult)   Pulse 74   Temp 36.7 °C (98.1 °F) (Temporal)   Resp 16   Ht 1.524 m (5')    Wt 61.5 kg (135 lb 9.3 oz)   SpO2 95%   BMI 26.48 kg/m²      Physical Exam    She appears in no acute distress.  Vital signs are stable.  There is no malar rash or sialorrhea.  The neck is supple, range of motion is full.  Cardiac evaluation is unremarkable.  There is no lower extremity edema.     Neurological Exam    Fully oriented, there is no aphasia, apraxia, or inattention.    PERRLA/EOMI, visual fields are full, there is no ptosis, facial movements are symmetric, lip apposition intact, tongue movements as well.  Neck flexion and extension as well as head rotation and shoulder shrug are intact and symmetric.  The tongue and uvula are midline, sensory exam is intact to temperature and light touch.    Musculoskeletal exam reveals normal tone throughout, there is no tremor, asterixis, or drift.  After 30 seconds of arm flapping, deltoid muscle strength is maintained without fatigue.  Strength is intact elsewhere.  Reflexes are symmetric.    She stands easily from seated positioning.  She does not need to push herself off the chair arms.  There is no appendicular dystaxia.  Repetitive movements are symmetric with normal amplitude and frequencies throughout.  Gait is normal and station and stride length, armswing is symmetric.    Sensory exam is intact to temperature and vibration, Romberg is absent.    Her gMG-ADL score is 0.    Assessment & Plan  Myasthenia gravis (HCC)  Clinically stable, both of us are happy that she is off prednisone, and given the issues of side effects (a.m. headaches, urinary incontinence, insomnia, cramping, etc.), we would like to avoid that use if we need to go there into the future.  Given her age and the stabilization of her disease, I suspect her risk of relapse is very small.  She will continue Mestinon at its present regimen of dosing since this provides adequate control for symptoms.  She will not likely tolerate further dose reductions, both of us are quite comfortable with  her present state of affairs.  She understands symptoms suggestive of disease relapse and crisis, she has call the office in that circumstance.  Because of tolerability issues on prednisone, in the circumstance of increasing symptoms, IVIG probably will be the treatment of choice for her.  Hopefully we will never need to cross that bridge.  We follow-up in 1 year.    Orders:    pyridostigmine (MESTINON) 60 MG Tab; Take 0.5 Tablets by mouth 3 times a day.    Time: 40 minutes in total spent on patient care including pre-charting, record review, discussion with healthcare staff and documentation.  This includes face-to-face time for exam, review, discussion, as well as counseling and coordinating care.

## 2025-06-04 ENCOUNTER — APPOINTMENT (OUTPATIENT)
Dept: NEUROLOGY | Facility: MEDICAL CENTER | Age: 80
End: 2025-06-04
Attending: PSYCHIATRY & NEUROLOGY
Payer: MEDICARE

## 2025-07-17 ENCOUNTER — OFFICE VISIT (OUTPATIENT)
Facility: MEDICAL CENTER | Age: 80
End: 2025-07-17
Attending: PSYCHIATRY & NEUROLOGY
Payer: MEDICARE

## 2025-07-17 VITALS
BODY MASS INDEX: 25.52 KG/M2 | DIASTOLIC BLOOD PRESSURE: 62 MMHG | HEIGHT: 60 IN | OXYGEN SATURATION: 95 % | SYSTOLIC BLOOD PRESSURE: 114 MMHG | HEART RATE: 64 BPM | TEMPERATURE: 98.3 F | RESPIRATION RATE: 16 BRPM | WEIGHT: 130 LBS

## 2025-07-17 DIAGNOSIS — G70.00 MYASTHENIA GRAVIS (HCC): Primary | ICD-10-CM

## 2025-07-17 PROCEDURE — 99417 PROLNG OP E/M EACH 15 MIN: CPT | Performed by: PSYCHIATRY & NEUROLOGY

## 2025-07-17 PROCEDURE — 99215 OFFICE O/P EST HI 40 MIN: CPT | Performed by: PSYCHIATRY & NEUROLOGY

## 2025-07-17 PROCEDURE — 99214 OFFICE O/P EST MOD 30 MIN: CPT | Performed by: PSYCHIATRY & NEUROLOGY

## 2025-07-17 PROCEDURE — 3074F SYST BP LT 130 MM HG: CPT | Performed by: PSYCHIATRY & NEUROLOGY

## 2025-07-17 PROCEDURE — 3078F DIAST BP <80 MM HG: CPT | Performed by: PSYCHIATRY & NEUROLOGY

## 2025-07-17 RX ORDER — NIFEDIPINE 30 MG/1
TABLET, EXTENDED RELEASE ORAL
COMMUNITY

## 2025-07-17 RX ORDER — DIPHENHYDRAMINE HYDROCHLORIDE 50 MG/ML
25 INJECTION, SOLUTION INTRAMUSCULAR; INTRAVENOUS PRN
Status: CANCELLED | OUTPATIENT
Start: 2025-07-17

## 2025-07-17 RX ORDER — 0.9 % SODIUM CHLORIDE 0.9 %
3 VIAL (ML) INJECTION PRN
Status: CANCELLED | OUTPATIENT
Start: 2025-07-17

## 2025-07-17 RX ORDER — ACETAMINOPHEN 325 MG/1
650 TABLET ORAL ONCE
Status: CANCELLED | OUTPATIENT
Start: 2025-07-17 | End: 2025-07-17

## 2025-07-17 RX ORDER — ACETAMINOPHEN 325 MG/1
650 TABLET ORAL PRN
Status: CANCELLED | OUTPATIENT
Start: 2025-07-17

## 2025-07-17 RX ORDER — SODIUM CHLORIDE 9 MG/ML
1000 INJECTION, SOLUTION INTRAVENOUS PRN
Status: CANCELLED | OUTPATIENT
Start: 2025-07-17

## 2025-07-17 RX ORDER — ALBUTEROL SULFATE 5 MG/ML
2.5 SOLUTION RESPIRATORY (INHALATION) PRN
Status: CANCELLED | OUTPATIENT
Start: 2025-07-17

## 2025-07-17 RX ORDER — EPINEPHRINE 1 MG/ML(1)
0.5 AMPUL (ML) INJECTION PRN
Status: CANCELLED | OUTPATIENT
Start: 2025-07-17

## 2025-07-17 RX ORDER — ONDANSETRON 2 MG/ML
8 INJECTION INTRAMUSCULAR; INTRAVENOUS PRN
Status: CANCELLED | OUTPATIENT
Start: 2025-07-17

## 2025-07-17 RX ORDER — 0.9 % SODIUM CHLORIDE 0.9 %
10 VIAL (ML) INJECTION PRN
Status: CANCELLED | OUTPATIENT
Start: 2025-07-17

## 2025-07-17 RX ORDER — AMLODIPINE BESYLATE 5 MG/1
TABLET ORAL
COMMUNITY

## 2025-07-17 RX ORDER — PREDNISONE 2.5 MG/1
2.5 TABLET ORAL DAILY
Qty: 7 TABLET | Refills: 0 | Status: SHIPPED | OUTPATIENT
Start: 2025-07-17

## 2025-07-17 RX ORDER — DIAZEPAM 10 MG/2ML
2.5 INJECTION, SOLUTION INTRAMUSCULAR; INTRAVENOUS EVERY 6 HOURS PRN
Status: CANCELLED
Start: 2025-07-17

## 2025-07-17 RX ORDER — 0.9 % SODIUM CHLORIDE 0.9 %
VIAL (ML) INJECTION PRN
Status: CANCELLED | OUTPATIENT
Start: 2025-07-17

## 2025-07-17 RX ORDER — DIPHENHYDRAMINE HCL 25 MG
50 TABLET ORAL ONCE
Status: CANCELLED | OUTPATIENT
Start: 2025-07-17 | End: 2025-07-17

## 2025-07-17 RX ORDER — METHYLPREDNISOLONE SODIUM SUCCINATE 125 MG/2ML
125 INJECTION, POWDER, LYOPHILIZED, FOR SOLUTION INTRAMUSCULAR; INTRAVENOUS PRN
Status: CANCELLED | OUTPATIENT
Start: 2025-07-17

## 2025-07-17 RX ORDER — ONDANSETRON 8 MG/1
8 TABLET, ORALLY DISINTEGRATING ORAL PRN
Status: CANCELLED | OUTPATIENT
Start: 2025-07-17

## 2025-07-17 RX ORDER — OLMESARTAN MEDOXOMIL 40 MG/1
TABLET ORAL
COMMUNITY
Start: 2025-03-26

## 2025-07-17 RX ORDER — METHYLPREDNISOLONE SODIUM SUCCINATE 125 MG/2ML
100 INJECTION, POWDER, LYOPHILIZED, FOR SOLUTION INTRAMUSCULAR; INTRAVENOUS ONCE
Status: CANCELLED | OUTPATIENT
Start: 2025-07-17 | End: 2025-07-17

## 2025-07-17 RX ORDER — PYRIDOSTIGMINE BROMIDE 60 MG/1
60 TABLET ORAL 4 TIMES DAILY
Qty: 120 TABLET | Refills: 4 | Status: SHIPPED | OUTPATIENT
Start: 2025-07-17

## 2025-07-17 RX ORDER — PREDNISONE 5 MG/1
TABLET ORAL
COMMUNITY
Start: 2025-07-11 | End: 2025-07-17

## 2025-07-17 RX ORDER — SODIUM CHLORIDE 9 MG/ML
INJECTION, SOLUTION INTRAVENOUS CONTINUOUS
Status: CANCELLED | OUTPATIENT
Start: 2025-07-17

## 2025-07-17 RX ORDER — MEPERIDINE HYDROCHLORIDE 25 MG/ML
25 INJECTION INTRAMUSCULAR; INTRAVENOUS; SUBCUTANEOUS PRN
Status: CANCELLED | OUTPATIENT
Start: 2025-07-17

## 2025-07-17 RX ORDER — LORAZEPAM 0.5 MG/1
0.5 TABLET ORAL PRN
Status: CANCELLED | OUTPATIENT
Start: 2025-07-17

## 2025-07-17 ASSESSMENT — ENCOUNTER SYMPTOMS
WEAKNESS: 1
SPEECH CHANGE: 1
TINGLING: 0
LOSS OF CONSCIOUSNESS: 0
SHORTNESS OF BREATH: 0
MEMORY LOSS: 0
SENSORY CHANGE: 0
DIARRHEA: 1
DOUBLE VISION: 1
DEPRESSION: 0

## 2025-07-17 ASSESSMENT — PATIENT HEALTH QUESTIONNAIRE - PHQ9: CLINICAL INTERPRETATION OF PHQ2 SCORE: 0

## 2025-07-17 NOTE — PROGRESS NOTES
Subjective     Priscilla Alonzo is a 79 y.o. female who presents for urgent follow-up, with a history of myasthenia gravis, but whose myasthenic symptoms have been progressively worsening over the last several weeks following a trip out of the country.     HPI    Last seen on May 8, 2025, she had been doing incredibly well.  She was off prednisone, all of the side effects and problems with the drug had stopped, she was able to reduce Mestinon to 30 mg 3 times daily with good tolerability, there was no wearing-off, etc.    She and her  went on a trip, and after matter of days while on the trip, her symptoms began to worsen.  She was not consistent with her Mestinon dosing at the time, but when she returned home, consistency with the medication was no longer an issue, but symptoms continued to worsen.  She contacted her PCP, and on July 11, 2025, Mestinon was increased to 60 mg 3 times daily, but she was also started on prednisone 5 mg daily.    Since then, symptoms continue to worsen, they certainly did not improve.  She contacted the office on July 13, complaining of difficulty with breathing, generalized fatigability, now with problems swallowing and chewing.  She increased the Mestinon to 4 times daily regimen, there is no wearing-off of the drug, she is simply plateaued with symptom worsening.  Prednisone was continued, and she has begun to develop the issues with cramping, diarrhea, and malaise on the higher dose of Mestinon, now also with some irritability, mild sleep distortion, these from the prednisone.    She now describes constant diplopia, even with primary gaze, there is ptosis with the left eye, fortunately chewing itself has not been curtailed though there are now episodes of choking, especially with liquid.  Speech is intermittently slurred.  Getting out of the chair is oftentimes difficult requiring using the arms of the chair.  She fatigues early in the afternoons, is napping on a daily basis  to help recover so she can function into the evenings.  None of this was apparent just 2 months ago.    Prior to that she denied any preceding viral-like illnesses, increased stress, she was not placed on any medications, etc.    Medical, surgical and family histories are reviewed, there are no new drug allergies.  She is on prednisone 5 mg daily, Mestinon 60 mg 4 times daily, Lexapro, Vivelle DOT, HCTZ, Benicar, Synthroid, Norvasc, Procardia XL, omega-3 fish oil, vitamin D with calcium and a multivitamin.    Review of Systems   Constitutional:  Positive for malaise/fatigue.   Eyes:  Positive for double vision.   Respiratory:  Negative for shortness of breath.    Gastrointestinal:  Positive for diarrhea.   Neurological:  Positive for speech change and weakness. Negative for tingling, sensory change and loss of consciousness.   Psychiatric/Behavioral:  Negative for depression, memory loss and suicidal ideas.    All other systems reviewed and are negative.    Objective     /62 (BP Location: Left arm, Patient Position: Sitting, BP Cuff Size: Adult)   Pulse 64   Temp 36.8 °C (98.3 °F) (Temporal)   Resp 16   Ht 1.524 m (5')   Wt 59 kg (130 lb)   SpO2 95%   BMI 25.39 kg/m²      Physical Exam    She appears in no acute distress, but she does look fatigued, a bit overwhelmed.  Vital signs are stable.  There is no malar rash, left eye ptosis is seen.  There is no jaw or temporal tenderness or jaw claudication.  The neck is supple, range of motion is full.  Cardiac evaluation is unremarkable.     Neurological Exam    Her gMG ADL is 8.    Fully oriented, there is no aphasia, apraxia, or inattention.    PERRLA, there is disconjugate see on horizontal gaze in both directions resulting in subjective diplopia, resolved with monocular vision using either eye.  Visual fields are full.  Facial movements are symmetric.  Lip apposition as well as jaw movements are intact.  Tongue movements laterally are slowed but overall  strength is intact.  Sensory exam is intact to light touch.  Neck rotation as well as head flexion and extension are normal.  Shoulder shrug is symmetric.    Musculoskeletal exam reveals normal tone, there is no tremor or drift.  With 30 seconds of arm flapping, there is no fatigability of the deltoid muscles.  Strength is intact elsewhere except for some slight weakness with IP bilaterally at 4+-5/5.    Reflexes are brisk and present throughout, there are no asymmetries, none are dropped.  The toes are downgoing bilaterally.    She stands slowly and does need to push herself off the exam table to get herself there.  She walks cautiously, stride length is shortened, she does not shuffle, but she accentuates hip movements to elevate and move the legs.  There is no appendicular dystaxia.  Repetitive movements are symmetric and normal in amplitude and frequencies in all 4 extremities.    Sensory exam is intact to vibration and temperature.  Assessment & Plan  Myasthenia gravis (HCC)  She is relapsing into a potential crisis, symptoms are clearly exacerbated, her MG-ADL score is now 8, warranting IVIG infusion, in the meantime I will get her off the steroids which are known to worsen symptoms before they improve clinical severity, but she also has a rough time tolerating the steroids.  Getting her off would also allow us to use Mestinon with better tolerability.    Because she is antibody positive, the levels will be repeated for thoroughness, but Vyvgart probably is going to be the next best choice following IVIG to stabilize.  Menactra immunization for meningococcal meningitis will be provided in anticipation of starting Vyvgart.  This must be done 2 weeks ahead of treatment initiation.  Hopefully the IVIG infusions will not limit the efficacy of the vaccination, thus it should be held until at least 2 weeks after IVIG treatment is initiated.  Thus with IVIG, it will be about 4 weeks before we can start  Vyvgart.    Prednisone will be titrated to a 2.5 mg daily dose for 1 week and then discontinued.  Mestinon will be continued as a 60 mg 4 times daily regimen. She will be contacted about the IVIG infusions, and we will move forward with Vyvgart authorization.  A follow-up appointment will be scheduled in a couple of months after she has been on IVIG and then started on Vyvgart.    Orders:    MYASTHENIA GRAVIS PANEL W/RFLX    prednisONE (DELTASONE) 2.5 MG Tab; Take 1 Tablet by mouth every day.    Meningococcal Conjugate Vaccine 4-Valent IM (Menactra)    pyridostigmine (MESTINON) 60 MG Tab; Take 1 Tablet by mouth 4 times a day.    Time: 60 minutes in total spent on patient care including pre-charting, record review, discussion with healthcare staff and documentation.  This includes face-to-face time for exam, review, discussion, as well as counseling and coordinating care.

## 2025-07-17 NOTE — ASSESSMENT & PLAN NOTE
She is relapsing into a potential crisis, symptoms are clearly exacerbated, her MG-ADL score is now 8, warranting IVIG infusion, in the meantime I will get her off the steroids which are known to worsen symptoms before they improve clinical severity, but she also has a rough time tolerating the steroids.  Getting her off would also allow us to use Mestinon with better tolerability.    Because she is antibody positive, the levels will be repeated for thoroughness, but Vyvgart probably is going to be the next best choice following IVIG to stabilize.  Menactra immunization for meningococcal meningitis will be provided in anticipation of starting Vyvgart.  This must be done 2 weeks ahead of treatment initiation.  Hopefully the IVIG infusions will not limit the efficacy of the vaccination, thus it should be held until at least 2 weeks after IVIG treatment is initiated.  Thus with IVIG, it will be about 4 weeks before we can start Vyvgart.    Prednisone will be titrated to a 2.5 mg daily dose for 1 week and then discontinued.  Mestinon will be continued as a 60 mg 4 times daily regimen. She will be contacted about the IVIG infusions, and we will move forward with Vyvgart authorization.  A follow-up appointment will be scheduled in a couple of months after she has been on IVIG and then started on Vyvgart.    Orders:    MYASTHENIA GRAVIS PANEL W/RFLX    prednisONE (DELTASONE) 2.5 MG Tab; Take 1 Tablet by mouth every day.    Meningococcal Conjugate Vaccine 4-Valent IM (Menactra)    pyridostigmine (MESTINON) 60 MG Tab; Take 1 Tablet by mouth 4 times a day.

## 2025-07-17 NOTE — Clinical Note
Kathrine, please contact Priscilla, tell her I need her to wait on the Menactra meningococcal vaccination to be done 2 weeks after the 5-day IVIG initiation treatment has been completed.  This will ensure the vaccine's efficacy.  She then needs to wait 2 weeks before we can actually begin Vyvgart infusions.  This means she may need to do an IVIG maintenance dose for 2 days, done 4 weeks after the initial 5-day treatment.  Tell her she continues to get off prednisone as we have discussed and to continue the Mestinon 60 mg 4 times a day.

## 2025-07-18 ENCOUNTER — HOSPITAL ENCOUNTER (OUTPATIENT)
Facility: MEDICAL CENTER | Age: 80
End: 2025-07-18
Attending: PSYCHIATRY & NEUROLOGY
Payer: MEDICARE

## 2025-07-18 PROCEDURE — 36415 COLL VENOUS BLD VENIPUNCTURE: CPT

## 2025-07-18 PROCEDURE — 86041 ACETYLCHOLN RCPTR BNDNG ANTB: CPT

## 2025-07-18 PROCEDURE — 86042 ACETYLCHOLN RCPTR BLCKG ANTB: CPT

## 2025-07-18 PROCEDURE — 86043 ACETYLCHOLN RCPTR MODLG ANTB: CPT

## 2025-07-25 LAB
ACHR BIND AB SER-SCNC: 49.4 NMOL/L (ref 0–0.4)
ACHR BLOCK AB/ACHR TOTAL SFR SER: 72 % (ref 0–26)
ACHR MOD AB/ACHR TOTAL SFR SER: 96 %

## 2025-07-28 ENCOUNTER — OUTPATIENT INFUSION SERVICES (OUTPATIENT)
Facility: MEDICAL CENTER | Age: 80
End: 2025-07-28
Attending: PSYCHIATRY & NEUROLOGY
Payer: MEDICARE

## 2025-07-28 VITALS
RESPIRATION RATE: 16 BRPM | WEIGHT: 129.85 LBS | BODY MASS INDEX: 22.17 KG/M2 | HEIGHT: 64 IN | HEART RATE: 70 BPM | TEMPERATURE: 97.2 F | DIASTOLIC BLOOD PRESSURE: 70 MMHG | SYSTOLIC BLOOD PRESSURE: 131 MMHG | OXYGEN SATURATION: 96 %

## 2025-07-28 DIAGNOSIS — G70.00 MYASTHENIA GRAVIS (HCC): Primary | ICD-10-CM

## 2025-07-28 PROCEDURE — 96365 THER/PROPH/DIAG IV INF INIT: CPT

## 2025-07-28 PROCEDURE — A9270 NON-COVERED ITEM OR SERVICE: HCPCS | Performed by: PSYCHIATRY & NEUROLOGY

## 2025-07-28 PROCEDURE — 96375 TX/PRO/DX INJ NEW DRUG ADDON: CPT

## 2025-07-28 PROCEDURE — 700111 HCHG RX REV CODE 636 W/ 250 OVERRIDE (IP): Mod: JZ,TB | Performed by: PSYCHIATRY & NEUROLOGY

## 2025-07-28 PROCEDURE — 700102 HCHG RX REV CODE 250 W/ 637 OVERRIDE(OP): Performed by: PSYCHIATRY & NEUROLOGY

## 2025-07-28 PROCEDURE — 96366 THER/PROPH/DIAG IV INF ADDON: CPT

## 2025-07-28 PROCEDURE — 700105 HCHG RX REV CODE 258: Performed by: PSYCHIATRY & NEUROLOGY

## 2025-07-28 RX ORDER — 0.9 % SODIUM CHLORIDE 0.9 %
10 VIAL (ML) INJECTION PRN
Status: CANCELLED | OUTPATIENT
Start: 2025-07-28

## 2025-07-28 RX ORDER — EPINEPHRINE 1 MG/ML(1)
0.5 AMPUL (ML) INJECTION PRN
Status: CANCELLED | OUTPATIENT
Start: 2025-07-28

## 2025-07-28 RX ORDER — MEPERIDINE HYDROCHLORIDE 25 MG/ML
25 INJECTION INTRAMUSCULAR; INTRAVENOUS; SUBCUTANEOUS PRN
Status: CANCELLED | OUTPATIENT
Start: 2025-07-28

## 2025-07-28 RX ORDER — LORAZEPAM 1 MG/1
0.5 TABLET ORAL PRN
Status: CANCELLED | OUTPATIENT
Start: 2025-07-28

## 2025-07-28 RX ORDER — ONDANSETRON 8 MG/1
8 TABLET, ORALLY DISINTEGRATING ORAL PRN
Status: CANCELLED | OUTPATIENT
Start: 2025-07-28

## 2025-07-28 RX ORDER — ACETAMINOPHEN 325 MG/1
650 TABLET ORAL ONCE
Status: CANCELLED | OUTPATIENT
Start: 2025-07-28 | End: 2025-07-28

## 2025-07-28 RX ORDER — ONDANSETRON 2 MG/ML
8 INJECTION INTRAMUSCULAR; INTRAVENOUS PRN
Status: CANCELLED | OUTPATIENT
Start: 2025-07-28

## 2025-07-28 RX ORDER — SODIUM CHLORIDE 9 MG/ML
INJECTION, SOLUTION INTRAVENOUS CONTINUOUS
Status: CANCELLED | OUTPATIENT
Start: 2025-07-28

## 2025-07-28 RX ORDER — DIPHENHYDRAMINE HYDROCHLORIDE 50 MG/ML
25 INJECTION, SOLUTION INTRAMUSCULAR; INTRAVENOUS PRN
Status: CANCELLED | OUTPATIENT
Start: 2025-07-28

## 2025-07-28 RX ORDER — ACETAMINOPHEN 325 MG/1
650 TABLET ORAL PRN
Status: CANCELLED | OUTPATIENT
Start: 2025-07-28

## 2025-07-28 RX ORDER — SODIUM CHLORIDE 9 MG/ML
1000 INJECTION, SOLUTION INTRAVENOUS PRN
Status: CANCELLED | OUTPATIENT
Start: 2025-07-28

## 2025-07-28 RX ORDER — DIPHENHYDRAMINE HCL 25 MG
50 TABLET ORAL ONCE
Status: CANCELLED | OUTPATIENT
Start: 2025-07-28 | End: 2025-07-28

## 2025-07-28 RX ORDER — DIPHENHYDRAMINE HCL 25 MG
50 TABLET ORAL ONCE
Status: COMPLETED | OUTPATIENT
Start: 2025-07-28 | End: 2025-07-28

## 2025-07-28 RX ORDER — 0.9 % SODIUM CHLORIDE 0.9 %
VIAL (ML) INJECTION PRN
Status: CANCELLED | OUTPATIENT
Start: 2025-07-28

## 2025-07-28 RX ORDER — METHYLPREDNISOLONE SODIUM SUCCINATE 125 MG/2ML
100 INJECTION, POWDER, LYOPHILIZED, FOR SOLUTION INTRAMUSCULAR; INTRAVENOUS ONCE
Status: CANCELLED | OUTPATIENT
Start: 2025-07-28 | End: 2025-07-28

## 2025-07-28 RX ORDER — ESTRADIOL 0.1 MG/G
CREAM VAGINAL
COMMUNITY
Start: 2025-06-07

## 2025-07-28 RX ORDER — DIAZEPAM 10 MG/2ML
2.5 INJECTION, SOLUTION INTRAMUSCULAR; INTRAVENOUS EVERY 6 HOURS PRN
Status: CANCELLED
Start: 2025-07-28

## 2025-07-28 RX ORDER — HYDROCHLOROTHIAZIDE 25 MG/1
TABLET ORAL
COMMUNITY

## 2025-07-28 RX ORDER — ACETAMINOPHEN 325 MG/1
650 TABLET ORAL ONCE
Status: COMPLETED | OUTPATIENT
Start: 2025-07-28 | End: 2025-07-28

## 2025-07-28 RX ORDER — LOSARTAN POTASSIUM 100 MG/1
TABLET ORAL
COMMUNITY

## 2025-07-28 RX ORDER — SODIUM CHLORIDE 9 MG/ML
INJECTION, SOLUTION INTRAVENOUS CONTINUOUS
Status: DISCONTINUED | OUTPATIENT
Start: 2025-07-28 | End: 2025-07-28 | Stop reason: HOSPADM

## 2025-07-28 RX ORDER — 0.9 % SODIUM CHLORIDE 0.9 %
3 VIAL (ML) INJECTION PRN
Status: CANCELLED | OUTPATIENT
Start: 2025-07-28

## 2025-07-28 RX ORDER — ALBUTEROL SULFATE 5 MG/ML
2.5 SOLUTION RESPIRATORY (INHALATION) PRN
Status: CANCELLED | OUTPATIENT
Start: 2025-07-28

## 2025-07-28 RX ORDER — METHYLPREDNISOLONE SODIUM SUCCINATE 125 MG/2ML
100 INJECTION, POWDER, LYOPHILIZED, FOR SOLUTION INTRAMUSCULAR; INTRAVENOUS ONCE
Status: COMPLETED | OUTPATIENT
Start: 2025-07-28 | End: 2025-07-28

## 2025-07-28 RX ORDER — METHYLPREDNISOLONE SODIUM SUCCINATE 125 MG/2ML
125 INJECTION, POWDER, LYOPHILIZED, FOR SOLUTION INTRAMUSCULAR; INTRAVENOUS PRN
Status: CANCELLED | OUTPATIENT
Start: 2025-07-28

## 2025-07-28 RX ADMIN — SODIUM CHLORIDE: 9 INJECTION, SOLUTION INTRAVENOUS at 08:02

## 2025-07-28 RX ADMIN — IMMUNE GLOBULIN (HUMAN) 20 G: 10 INJECTION INTRAVENOUS; SUBCUTANEOUS at 08:33

## 2025-07-28 RX ADMIN — METHYLPREDNISOLONE SODIUM SUCCINATE 100 MG: 125 INJECTION INTRAMUSCULAR; INTRAVENOUS at 08:14

## 2025-07-28 RX ADMIN — ACETAMINOPHEN 650 MG: 325 TABLET ORAL at 08:10

## 2025-07-28 RX ADMIN — DIPHENHYDRAMINE HYDROCHLORIDE 50 MG: 25 TABLET ORAL at 08:10

## 2025-07-28 NOTE — PROGRESS NOTES
Priscilla [facility] for [facility] D1 for MG crisis. Pt health, medication and allergies reviewed. IVIG drug info reviewed, handout provided and questions answered. POC reviewed.    IV started in L FA x 1 attempt, brisk blood return noted. IVF TKO.    Premeds given per MD order, Pt tolerated well. IVIG infused per MD order, titrated per rate sheet starting at 36 ml/hr, max rate of 108 ml/hr. 0953- Pt c/o tingling L fingers. Medication stopped for 10 minutes, Pt reports slight tingling. IVIG restarted at previous rate without incident.    Infusion completed, Pt unable to drive d/t benadryl. Pt called friend for ride. IV flushed, capped and wrapped, left intact for tomorrows infusion.     1158- Pt discharged home with her friend in no acute distress.

## 2025-07-29 ENCOUNTER — OUTPATIENT INFUSION SERVICES (OUTPATIENT)
Facility: MEDICAL CENTER | Age: 80
End: 2025-07-29
Attending: PSYCHIATRY & NEUROLOGY
Payer: MEDICARE

## 2025-07-29 VITALS
SYSTOLIC BLOOD PRESSURE: 125 MMHG | WEIGHT: 129.19 LBS | HEART RATE: 76 BPM | DIASTOLIC BLOOD PRESSURE: 73 MMHG | BODY MASS INDEX: 22.06 KG/M2 | RESPIRATION RATE: 16 BRPM | TEMPERATURE: 97.1 F | HEIGHT: 64 IN | OXYGEN SATURATION: 94 %

## 2025-07-29 DIAGNOSIS — G70.00 MYASTHENIA GRAVIS (HCC): Primary | ICD-10-CM

## 2025-07-29 PROCEDURE — 700105 HCHG RX REV CODE 258: Performed by: PSYCHIATRY & NEUROLOGY

## 2025-07-29 PROCEDURE — 700111 HCHG RX REV CODE 636 W/ 250 OVERRIDE (IP): Mod: JZ,TB | Performed by: PSYCHIATRY & NEUROLOGY

## 2025-07-29 PROCEDURE — 96365 THER/PROPH/DIAG IV INF INIT: CPT

## 2025-07-29 PROCEDURE — 700102 HCHG RX REV CODE 250 W/ 637 OVERRIDE(OP): Performed by: PSYCHIATRY & NEUROLOGY

## 2025-07-29 PROCEDURE — 96366 THER/PROPH/DIAG IV INF ADDON: CPT

## 2025-07-29 PROCEDURE — 96375 TX/PRO/DX INJ NEW DRUG ADDON: CPT

## 2025-07-29 PROCEDURE — A9270 NON-COVERED ITEM OR SERVICE: HCPCS | Performed by: PSYCHIATRY & NEUROLOGY

## 2025-07-29 RX ORDER — DIPHENHYDRAMINE HYDROCHLORIDE 50 MG/ML
25 INJECTION, SOLUTION INTRAMUSCULAR; INTRAVENOUS PRN
Status: DISCONTINUED | OUTPATIENT
Start: 2025-07-29 | End: 2025-07-29 | Stop reason: HOSPADM

## 2025-07-29 RX ORDER — METHYLPREDNISOLONE SODIUM SUCCINATE 125 MG/2ML
125 INJECTION, POWDER, LYOPHILIZED, FOR SOLUTION INTRAMUSCULAR; INTRAVENOUS PRN
Status: CANCELLED | OUTPATIENT
Start: 2025-07-30

## 2025-07-29 RX ORDER — EPINEPHRINE 1 MG/ML(1)
0.5 AMPUL (ML) INJECTION PRN
Status: DISCONTINUED | OUTPATIENT
Start: 2025-07-29 | End: 2025-07-29 | Stop reason: HOSPADM

## 2025-07-29 RX ORDER — SODIUM CHLORIDE 9 MG/ML
250 INJECTION, SOLUTION INTRAVENOUS ONCE
Status: CANCELLED
Start: 2025-07-30 | End: 2025-07-30

## 2025-07-29 RX ORDER — SODIUM CHLORIDE 9 MG/ML
1000 INJECTION, SOLUTION INTRAVENOUS PRN
Status: DISCONTINUED | OUTPATIENT
Start: 2025-07-29 | End: 2025-07-29 | Stop reason: HOSPADM

## 2025-07-29 RX ORDER — METHYLPREDNISOLONE SODIUM SUCCINATE 125 MG/2ML
125 INJECTION, POWDER, LYOPHILIZED, FOR SOLUTION INTRAMUSCULAR; INTRAVENOUS PRN
Status: DISCONTINUED | OUTPATIENT
Start: 2025-07-29 | End: 2025-07-29 | Stop reason: HOSPADM

## 2025-07-29 RX ORDER — MEPERIDINE HYDROCHLORIDE 25 MG/ML
25 INJECTION INTRAMUSCULAR; INTRAVENOUS; SUBCUTANEOUS PRN
Status: DISCONTINUED | OUTPATIENT
Start: 2025-07-29 | End: 2025-07-29 | Stop reason: HOSPADM

## 2025-07-29 RX ORDER — ONDANSETRON 8 MG/1
8 TABLET, ORALLY DISINTEGRATING ORAL PRN
Status: CANCELLED | OUTPATIENT
Start: 2025-07-30

## 2025-07-29 RX ORDER — ACETAMINOPHEN 325 MG/1
650 TABLET ORAL PRN
Status: CANCELLED | OUTPATIENT
Start: 2025-07-30

## 2025-07-29 RX ORDER — METHYLPREDNISOLONE SODIUM SUCCINATE 125 MG/2ML
100 INJECTION, POWDER, LYOPHILIZED, FOR SOLUTION INTRAMUSCULAR; INTRAVENOUS ONCE
Status: COMPLETED | OUTPATIENT
Start: 2025-07-29 | End: 2025-07-29

## 2025-07-29 RX ORDER — EPINEPHRINE 1 MG/ML(1)
0.5 AMPUL (ML) INJECTION PRN
Status: CANCELLED | OUTPATIENT
Start: 2025-07-30

## 2025-07-29 RX ORDER — SODIUM CHLORIDE 9 MG/ML
1000 INJECTION, SOLUTION INTRAVENOUS PRN
Status: CANCELLED | OUTPATIENT
Start: 2025-07-30

## 2025-07-29 RX ORDER — 0.9 % SODIUM CHLORIDE 0.9 %
VIAL (ML) INJECTION PRN
Status: CANCELLED | OUTPATIENT
Start: 2025-07-30

## 2025-07-29 RX ORDER — DIAZEPAM 10 MG/2ML
2.5 INJECTION, SOLUTION INTRAMUSCULAR; INTRAVENOUS EVERY 6 HOURS PRN
Status: CANCELLED
Start: 2025-07-30

## 2025-07-29 RX ORDER — METHYLPREDNISOLONE SODIUM SUCCINATE 125 MG/2ML
100 INJECTION, POWDER, LYOPHILIZED, FOR SOLUTION INTRAMUSCULAR; INTRAVENOUS ONCE
Status: CANCELLED | OUTPATIENT
Start: 2025-07-30 | End: 2025-07-30

## 2025-07-29 RX ORDER — 0.9 % SODIUM CHLORIDE 0.9 %
10 VIAL (ML) INJECTION PRN
Status: CANCELLED | OUTPATIENT
Start: 2025-07-30

## 2025-07-29 RX ORDER — MEPERIDINE HYDROCHLORIDE 25 MG/ML
25 INJECTION INTRAMUSCULAR; INTRAVENOUS; SUBCUTANEOUS PRN
Status: CANCELLED | OUTPATIENT
Start: 2025-07-30

## 2025-07-29 RX ORDER — ALBUTEROL SULFATE 5 MG/ML
2.5 SOLUTION RESPIRATORY (INHALATION) PRN
Status: CANCELLED | OUTPATIENT
Start: 2025-07-30

## 2025-07-29 RX ORDER — ACETAMINOPHEN 325 MG/1
650 TABLET ORAL PRN
Status: DISCONTINUED | OUTPATIENT
Start: 2025-07-29 | End: 2025-07-29 | Stop reason: HOSPADM

## 2025-07-29 RX ORDER — LORAZEPAM 1 MG/1
0.5 TABLET ORAL PRN
Status: CANCELLED | OUTPATIENT
Start: 2025-07-30

## 2025-07-29 RX ORDER — ACETAMINOPHEN 325 MG/1
650 TABLET ORAL ONCE
Status: COMPLETED | OUTPATIENT
Start: 2025-07-29 | End: 2025-07-29

## 2025-07-29 RX ORDER — ONDANSETRON 2 MG/ML
8 INJECTION INTRAMUSCULAR; INTRAVENOUS PRN
Status: DISCONTINUED | OUTPATIENT
Start: 2025-07-29 | End: 2025-07-29 | Stop reason: HOSPADM

## 2025-07-29 RX ORDER — LORAZEPAM 1 MG/1
0.5 TABLET ORAL PRN
Status: DISCONTINUED | OUTPATIENT
Start: 2025-07-29 | End: 2025-07-29 | Stop reason: HOSPADM

## 2025-07-29 RX ORDER — DIAZEPAM 10 MG/2ML
2.5 INJECTION, SOLUTION INTRAMUSCULAR; INTRAVENOUS EVERY 6 HOURS PRN
Status: DISCONTINUED | OUTPATIENT
Start: 2025-07-29 | End: 2025-07-29

## 2025-07-29 RX ORDER — SODIUM CHLORIDE 9 MG/ML
250 INJECTION, SOLUTION INTRAVENOUS ONCE
Status: COMPLETED | OUTPATIENT
Start: 2025-07-29 | End: 2025-07-29

## 2025-07-29 RX ORDER — ACETAMINOPHEN 325 MG/1
650 TABLET ORAL ONCE
Status: CANCELLED | OUTPATIENT
Start: 2025-07-30 | End: 2025-07-30

## 2025-07-29 RX ORDER — ONDANSETRON 2 MG/ML
8 INJECTION INTRAMUSCULAR; INTRAVENOUS PRN
Status: CANCELLED | OUTPATIENT
Start: 2025-07-30

## 2025-07-29 RX ORDER — DIPHENHYDRAMINE HCL 25 MG
50 TABLET ORAL ONCE
Status: COMPLETED | OUTPATIENT
Start: 2025-07-29 | End: 2025-07-29

## 2025-07-29 RX ORDER — DIPHENHYDRAMINE HCL 25 MG
50 TABLET ORAL ONCE
Status: CANCELLED | OUTPATIENT
Start: 2025-07-30 | End: 2025-07-30

## 2025-07-29 RX ORDER — ALBUTEROL SULFATE 5 MG/ML
2.5 SOLUTION RESPIRATORY (INHALATION) PRN
Status: DISCONTINUED | OUTPATIENT
Start: 2025-07-29 | End: 2025-07-29 | Stop reason: HOSPADM

## 2025-07-29 RX ORDER — 0.9 % SODIUM CHLORIDE 0.9 %
3 VIAL (ML) INJECTION PRN
Status: CANCELLED | OUTPATIENT
Start: 2025-07-30

## 2025-07-29 RX ORDER — DIPHENHYDRAMINE HYDROCHLORIDE 50 MG/ML
25 INJECTION, SOLUTION INTRAMUSCULAR; INTRAVENOUS PRN
Status: CANCELLED | OUTPATIENT
Start: 2025-07-30

## 2025-07-29 RX ORDER — SODIUM CHLORIDE 9 MG/ML
INJECTION, SOLUTION INTRAVENOUS CONTINUOUS
Status: CANCELLED | OUTPATIENT
Start: 2025-07-30

## 2025-07-29 RX ORDER — ONDANSETRON 8 MG/1
8 TABLET, ORALLY DISINTEGRATING ORAL PRN
Status: DISCONTINUED | OUTPATIENT
Start: 2025-07-29 | End: 2025-07-29 | Stop reason: HOSPADM

## 2025-07-29 RX ADMIN — METHYLPREDNISOLONE SODIUM SUCCINATE 100 MG: 125 INJECTION INTRAMUSCULAR; INTRAVENOUS at 07:48

## 2025-07-29 RX ADMIN — ACETAMINOPHEN 650 MG: 325 TABLET ORAL at 07:47

## 2025-07-29 RX ADMIN — SODIUM CHLORIDE 250 ML: 9 INJECTION, SOLUTION INTRAVENOUS at 07:45

## 2025-07-29 RX ADMIN — IMMUNE GLOBULIN (HUMAN) 20 G: 10 INJECTION INTRAVENOUS; SUBCUTANEOUS at 08:11

## 2025-07-29 RX ADMIN — DIPHENHYDRAMINE HYDROCHLORIDE 50 MG: 25 TABLET ORAL at 07:48

## 2025-07-29 NOTE — PROGRESS NOTES
Priscilla arrives ambulatory to [facility] for 2/5 IVIG for MG.  Priscilla reports severe headache after first infusion.  Telephone order received per Dr Thompson give  ml bolus prior to IVIG infusion.  PIV present to left FA, flushes easily, + blood return observed.  Pre-medications and pre-hydration infused as ordered.  IVIG titrated at initial rate per patient tolerance.  Priscilla tolerated all treatments well.  PIV flushed, wrapped in gauze and protective sleeve for tomorrow's use.  Priscilla educated on importance of hydration, she verbalizes understanding and agreement.  Priscilla discharged in no apparent distress, returns tomorrow for next infusion.

## 2025-07-30 ENCOUNTER — OUTPATIENT INFUSION SERVICES (OUTPATIENT)
Facility: MEDICAL CENTER | Age: 80
End: 2025-07-30
Attending: PSYCHIATRY & NEUROLOGY
Payer: MEDICARE

## 2025-07-30 VITALS
HEIGHT: 64 IN | RESPIRATION RATE: 16 BRPM | SYSTOLIC BLOOD PRESSURE: 123 MMHG | WEIGHT: 131.39 LBS | DIASTOLIC BLOOD PRESSURE: 69 MMHG | BODY MASS INDEX: 22.43 KG/M2 | TEMPERATURE: 98.7 F | OXYGEN SATURATION: 94 % | HEART RATE: 61 BPM

## 2025-07-30 DIAGNOSIS — G70.00 MYASTHENIA GRAVIS (HCC): Primary | ICD-10-CM

## 2025-07-30 PROCEDURE — A9270 NON-COVERED ITEM OR SERVICE: HCPCS | Performed by: PSYCHIATRY & NEUROLOGY

## 2025-07-30 PROCEDURE — 700105 HCHG RX REV CODE 258: Performed by: PSYCHIATRY & NEUROLOGY

## 2025-07-30 PROCEDURE — 700111 HCHG RX REV CODE 636 W/ 250 OVERRIDE (IP): Mod: JZ,TB | Performed by: PSYCHIATRY & NEUROLOGY

## 2025-07-30 PROCEDURE — 96361 HYDRATE IV INFUSION ADD-ON: CPT

## 2025-07-30 PROCEDURE — 96365 THER/PROPH/DIAG IV INF INIT: CPT

## 2025-07-30 PROCEDURE — 96375 TX/PRO/DX INJ NEW DRUG ADDON: CPT

## 2025-07-30 PROCEDURE — 700102 HCHG RX REV CODE 250 W/ 637 OVERRIDE(OP): Performed by: PSYCHIATRY & NEUROLOGY

## 2025-07-30 PROCEDURE — 96366 THER/PROPH/DIAG IV INF ADDON: CPT

## 2025-07-30 RX ORDER — ACETAMINOPHEN 325 MG/1
650 TABLET ORAL PRN
Status: CANCELLED | OUTPATIENT
Start: 2025-07-31

## 2025-07-30 RX ORDER — MEPERIDINE HYDROCHLORIDE 25 MG/ML
25 INJECTION INTRAMUSCULAR; INTRAVENOUS; SUBCUTANEOUS PRN
Status: CANCELLED | OUTPATIENT
Start: 2025-07-31

## 2025-07-30 RX ORDER — SODIUM CHLORIDE 9 MG/ML
250 INJECTION, SOLUTION INTRAVENOUS ONCE
Status: COMPLETED | OUTPATIENT
Start: 2025-07-30 | End: 2025-07-30

## 2025-07-30 RX ORDER — ONDANSETRON 8 MG/1
8 TABLET, ORALLY DISINTEGRATING ORAL PRN
Status: CANCELLED | OUTPATIENT
Start: 2025-07-31

## 2025-07-30 RX ORDER — LORAZEPAM 1 MG/1
0.5 TABLET ORAL PRN
Status: CANCELLED | OUTPATIENT
Start: 2025-07-31

## 2025-07-30 RX ORDER — ALBUTEROL SULFATE 5 MG/ML
2.5 SOLUTION RESPIRATORY (INHALATION) PRN
Status: CANCELLED | OUTPATIENT
Start: 2025-07-31

## 2025-07-30 RX ORDER — METHYLPREDNISOLONE SODIUM SUCCINATE 125 MG/2ML
100 INJECTION, POWDER, LYOPHILIZED, FOR SOLUTION INTRAMUSCULAR; INTRAVENOUS ONCE
Status: CANCELLED | OUTPATIENT
Start: 2025-07-31 | End: 2025-07-31

## 2025-07-30 RX ORDER — SODIUM CHLORIDE 9 MG/ML
INJECTION, SOLUTION INTRAVENOUS CONTINUOUS
Status: CANCELLED | OUTPATIENT
Start: 2025-07-31

## 2025-07-30 RX ORDER — METHYLPREDNISOLONE SODIUM SUCCINATE 125 MG/2ML
100 INJECTION, POWDER, LYOPHILIZED, FOR SOLUTION INTRAMUSCULAR; INTRAVENOUS ONCE
Status: COMPLETED | OUTPATIENT
Start: 2025-07-30 | End: 2025-07-30

## 2025-07-30 RX ORDER — ACETAMINOPHEN 325 MG/1
650 TABLET ORAL ONCE
Status: COMPLETED | OUTPATIENT
Start: 2025-07-30 | End: 2025-07-30

## 2025-07-30 RX ORDER — 0.9 % SODIUM CHLORIDE 0.9 %
10 VIAL (ML) INJECTION PRN
Status: CANCELLED | OUTPATIENT
Start: 2025-07-31

## 2025-07-30 RX ORDER — ONDANSETRON 2 MG/ML
8 INJECTION INTRAMUSCULAR; INTRAVENOUS PRN
Status: CANCELLED | OUTPATIENT
Start: 2025-07-31

## 2025-07-30 RX ORDER — METHYLPREDNISOLONE SODIUM SUCCINATE 125 MG/2ML
125 INJECTION, POWDER, LYOPHILIZED, FOR SOLUTION INTRAMUSCULAR; INTRAVENOUS PRN
Status: CANCELLED | OUTPATIENT
Start: 2025-07-31

## 2025-07-30 RX ORDER — 0.9 % SODIUM CHLORIDE 0.9 %
3 VIAL (ML) INJECTION PRN
Status: CANCELLED | OUTPATIENT
Start: 2025-07-31

## 2025-07-30 RX ORDER — DIPHENHYDRAMINE HCL 25 MG
50 TABLET ORAL ONCE
Status: CANCELLED | OUTPATIENT
Start: 2025-07-31 | End: 2025-07-31

## 2025-07-30 RX ORDER — DIAZEPAM 10 MG/2ML
2.5 INJECTION, SOLUTION INTRAMUSCULAR; INTRAVENOUS EVERY 6 HOURS PRN
Status: CANCELLED
Start: 2025-07-31

## 2025-07-30 RX ORDER — SODIUM CHLORIDE 9 MG/ML
1000 INJECTION, SOLUTION INTRAVENOUS PRN
Status: CANCELLED | OUTPATIENT
Start: 2025-07-31

## 2025-07-30 RX ORDER — SODIUM CHLORIDE 9 MG/ML
250 INJECTION, SOLUTION INTRAVENOUS ONCE
Status: CANCELLED
Start: 2025-07-31 | End: 2025-07-31

## 2025-07-30 RX ORDER — DIPHENHYDRAMINE HCL 25 MG
50 TABLET ORAL ONCE
Status: COMPLETED | OUTPATIENT
Start: 2025-07-30 | End: 2025-07-30

## 2025-07-30 RX ORDER — ACETAMINOPHEN 325 MG/1
650 TABLET ORAL ONCE
Status: CANCELLED | OUTPATIENT
Start: 2025-07-31 | End: 2025-07-31

## 2025-07-30 RX ORDER — 0.9 % SODIUM CHLORIDE 0.9 %
VIAL (ML) INJECTION PRN
Status: CANCELLED | OUTPATIENT
Start: 2025-07-31

## 2025-07-30 RX ORDER — EPINEPHRINE 1 MG/ML(1)
0.5 AMPUL (ML) INJECTION PRN
Status: CANCELLED | OUTPATIENT
Start: 2025-07-31

## 2025-07-30 RX ORDER — DIPHENHYDRAMINE HYDROCHLORIDE 50 MG/ML
25 INJECTION, SOLUTION INTRAMUSCULAR; INTRAVENOUS PRN
Status: CANCELLED | OUTPATIENT
Start: 2025-07-31

## 2025-07-30 RX ADMIN — DIPHENHYDRAMINE HYDROCHLORIDE 50 MG: 25 TABLET ORAL at 08:17

## 2025-07-30 RX ADMIN — ACETAMINOPHEN 650 MG: 325 TABLET ORAL at 08:18

## 2025-07-30 RX ADMIN — SODIUM CHLORIDE 250 ML: 9 INJECTION, SOLUTION INTRAVENOUS at 08:30

## 2025-07-30 RX ADMIN — METHYLPREDNISOLONE SODIUM SUCCINATE 100 MG: 125 INJECTION INTRAMUSCULAR; INTRAVENOUS at 08:18

## 2025-07-30 RX ADMIN — IMMUNE GLOBULIN (HUMAN) 20 G: 10 INJECTION INTRAVENOUS; SUBCUTANEOUS at 08:48

## 2025-07-30 NOTE — PROGRESS NOTES
Pt arrived ambulatory to  for day 3 IVIG. POC discussed. PIV in place from [facility], flushes well and blood return noted. Premedications given. IVIG infused to max rate followed by 250 cc NS hydration. Pt tolerated well. PIV saline locked and left in place for return use, appointment confirmed. Pt discharged from [facility] in NAD under self care.

## 2025-07-31 ENCOUNTER — OUTPATIENT INFUSION SERVICES (OUTPATIENT)
Facility: MEDICAL CENTER | Age: 80
End: 2025-07-31
Attending: PSYCHIATRY & NEUROLOGY
Payer: MEDICARE

## 2025-07-31 VITALS
OXYGEN SATURATION: 94 % | HEIGHT: 64 IN | BODY MASS INDEX: 22.3 KG/M2 | WEIGHT: 130.62 LBS | SYSTOLIC BLOOD PRESSURE: 140 MMHG | DIASTOLIC BLOOD PRESSURE: 66 MMHG | HEART RATE: 62 BPM | TEMPERATURE: 98.3 F | RESPIRATION RATE: 17 BRPM

## 2025-07-31 DIAGNOSIS — G70.00 MYASTHENIA GRAVIS (HCC): Primary | ICD-10-CM

## 2025-07-31 PROCEDURE — 96365 THER/PROPH/DIAG IV INF INIT: CPT

## 2025-07-31 PROCEDURE — A9270 NON-COVERED ITEM OR SERVICE: HCPCS | Performed by: PSYCHIATRY & NEUROLOGY

## 2025-07-31 PROCEDURE — 700105 HCHG RX REV CODE 258: Performed by: PSYCHIATRY & NEUROLOGY

## 2025-07-31 PROCEDURE — 700102 HCHG RX REV CODE 250 W/ 637 OVERRIDE(OP): Performed by: PSYCHIATRY & NEUROLOGY

## 2025-07-31 PROCEDURE — 96375 TX/PRO/DX INJ NEW DRUG ADDON: CPT

## 2025-07-31 PROCEDURE — 96374 THER/PROPH/DIAG INJ IV PUSH: CPT

## 2025-07-31 PROCEDURE — 700111 HCHG RX REV CODE 636 W/ 250 OVERRIDE (IP): Mod: JZ,TB | Performed by: PSYCHIATRY & NEUROLOGY

## 2025-07-31 PROCEDURE — 96366 THER/PROPH/DIAG IV INF ADDON: CPT

## 2025-07-31 RX ORDER — ACETAMINOPHEN 325 MG/1
650 TABLET ORAL ONCE
OUTPATIENT
Start: 2025-08-01 | End: 2025-08-01

## 2025-07-31 RX ORDER — ONDANSETRON 8 MG/1
8 TABLET, ORALLY DISINTEGRATING ORAL PRN
OUTPATIENT
Start: 2025-08-01

## 2025-07-31 RX ORDER — 0.9 % SODIUM CHLORIDE 0.9 %
10 VIAL (ML) INJECTION PRN
OUTPATIENT
Start: 2025-08-01

## 2025-07-31 RX ORDER — 0.9 % SODIUM CHLORIDE 0.9 %
3 VIAL (ML) INJECTION PRN
OUTPATIENT
Start: 2025-08-01

## 2025-07-31 RX ORDER — METHYLPREDNISOLONE SODIUM SUCCINATE 125 MG/2ML
100 INJECTION, POWDER, LYOPHILIZED, FOR SOLUTION INTRAMUSCULAR; INTRAVENOUS ONCE
Status: COMPLETED | OUTPATIENT
Start: 2025-07-31 | End: 2025-07-31

## 2025-07-31 RX ORDER — METHYLPREDNISOLONE SODIUM SUCCINATE 125 MG/2ML
125 INJECTION, POWDER, LYOPHILIZED, FOR SOLUTION INTRAMUSCULAR; INTRAVENOUS PRN
OUTPATIENT
Start: 2025-08-01

## 2025-07-31 RX ORDER — DIPHENHYDRAMINE HCL 25 MG
50 TABLET ORAL ONCE
Status: COMPLETED | OUTPATIENT
Start: 2025-07-31 | End: 2025-07-31

## 2025-07-31 RX ORDER — LORAZEPAM 1 MG/1
0.5 TABLET ORAL PRN
OUTPATIENT
Start: 2025-08-01

## 2025-07-31 RX ORDER — 0.9 % SODIUM CHLORIDE 0.9 %
VIAL (ML) INJECTION PRN
OUTPATIENT
Start: 2025-08-01

## 2025-07-31 RX ORDER — ACETAMINOPHEN 325 MG/1
650 TABLET ORAL PRN
OUTPATIENT
Start: 2025-08-01

## 2025-07-31 RX ORDER — MEPERIDINE HYDROCHLORIDE 25 MG/ML
25 INJECTION INTRAMUSCULAR; INTRAVENOUS; SUBCUTANEOUS PRN
OUTPATIENT
Start: 2025-08-01

## 2025-07-31 RX ORDER — ALBUTEROL SULFATE 5 MG/ML
2.5 SOLUTION RESPIRATORY (INHALATION) PRN
OUTPATIENT
Start: 2025-08-01

## 2025-07-31 RX ORDER — SODIUM CHLORIDE 9 MG/ML
INJECTION, SOLUTION INTRAVENOUS CONTINUOUS
OUTPATIENT
Start: 2025-08-01

## 2025-07-31 RX ORDER — DIAZEPAM 10 MG/2ML
2.5 INJECTION, SOLUTION INTRAMUSCULAR; INTRAVENOUS EVERY 6 HOURS PRN
Start: 2025-08-01

## 2025-07-31 RX ORDER — ONDANSETRON 2 MG/ML
8 INJECTION INTRAMUSCULAR; INTRAVENOUS PRN
OUTPATIENT
Start: 2025-08-01

## 2025-07-31 RX ORDER — ACETAMINOPHEN 325 MG/1
650 TABLET ORAL ONCE
Status: COMPLETED | OUTPATIENT
Start: 2025-07-31 | End: 2025-07-31

## 2025-07-31 RX ORDER — DIPHENHYDRAMINE HCL 25 MG
50 TABLET ORAL ONCE
OUTPATIENT
Start: 2025-08-01 | End: 2025-08-01

## 2025-07-31 RX ORDER — EPINEPHRINE 1 MG/ML(1)
0.5 AMPUL (ML) INJECTION PRN
OUTPATIENT
Start: 2025-08-01

## 2025-07-31 RX ORDER — SODIUM CHLORIDE 9 MG/ML
1000 INJECTION, SOLUTION INTRAVENOUS PRN
OUTPATIENT
Start: 2025-08-01

## 2025-07-31 RX ORDER — SODIUM CHLORIDE 9 MG/ML
250 INJECTION, SOLUTION INTRAVENOUS ONCE
Status: COMPLETED | OUTPATIENT
Start: 2025-07-31 | End: 2025-07-31

## 2025-07-31 RX ORDER — SODIUM CHLORIDE 9 MG/ML
250 INJECTION, SOLUTION INTRAVENOUS ONCE
Start: 2025-08-01 | End: 2025-08-01

## 2025-07-31 RX ORDER — DIPHENHYDRAMINE HYDROCHLORIDE 50 MG/ML
25 INJECTION, SOLUTION INTRAMUSCULAR; INTRAVENOUS PRN
OUTPATIENT
Start: 2025-08-01

## 2025-07-31 RX ORDER — METHYLPREDNISOLONE SODIUM SUCCINATE 125 MG/2ML
100 INJECTION, POWDER, LYOPHILIZED, FOR SOLUTION INTRAMUSCULAR; INTRAVENOUS ONCE
OUTPATIENT
Start: 2025-08-01 | End: 2025-08-01

## 2025-07-31 RX ADMIN — ACETAMINOPHEN 650 MG: 325 TABLET ORAL at 08:17

## 2025-07-31 RX ADMIN — IMMUNE GLOBULIN (HUMAN) 20 G: 10 INJECTION INTRAVENOUS; SUBCUTANEOUS at 08:56

## 2025-07-31 RX ADMIN — DIPHENHYDRAMINE HYDROCHLORIDE 50 MG: 25 TABLET ORAL at 08:17

## 2025-07-31 RX ADMIN — SODIUM CHLORIDE 250 ML: 9 INJECTION, SOLUTION INTRAVENOUS at 10:30

## 2025-07-31 RX ADMIN — METHYLPREDNISOLONE SODIUM SUCCINATE 100 MG: 125 INJECTION INTRAMUSCULAR; INTRAVENOUS at 08:17

## 2025-08-01 ENCOUNTER — OUTPATIENT INFUSION SERVICES (OUTPATIENT)
Facility: MEDICAL CENTER | Age: 80
End: 2025-08-01
Attending: PSYCHIATRY & NEUROLOGY
Payer: MEDICARE

## 2025-08-01 VITALS
BODY MASS INDEX: 22.41 KG/M2 | TEMPERATURE: 97.1 F | HEIGHT: 64 IN | HEART RATE: 61 BPM | SYSTOLIC BLOOD PRESSURE: 148 MMHG | OXYGEN SATURATION: 96 % | DIASTOLIC BLOOD PRESSURE: 79 MMHG | WEIGHT: 131.28 LBS | RESPIRATION RATE: 17 BRPM

## 2025-08-01 DIAGNOSIS — G70.00 MYASTHENIA GRAVIS (HCC): Primary | ICD-10-CM

## 2025-08-01 PROCEDURE — 700102 HCHG RX REV CODE 250 W/ 637 OVERRIDE(OP): Performed by: PSYCHIATRY & NEUROLOGY

## 2025-08-01 PROCEDURE — 96375 TX/PRO/DX INJ NEW DRUG ADDON: CPT

## 2025-08-01 PROCEDURE — 700105 HCHG RX REV CODE 258: Performed by: PSYCHIATRY & NEUROLOGY

## 2025-08-01 PROCEDURE — 96366 THER/PROPH/DIAG IV INF ADDON: CPT

## 2025-08-01 PROCEDURE — A9270 NON-COVERED ITEM OR SERVICE: HCPCS | Performed by: PSYCHIATRY & NEUROLOGY

## 2025-08-01 PROCEDURE — 96365 THER/PROPH/DIAG IV INF INIT: CPT

## 2025-08-01 PROCEDURE — 700111 HCHG RX REV CODE 636 W/ 250 OVERRIDE (IP): Mod: TB | Performed by: PSYCHIATRY & NEUROLOGY

## 2025-08-01 RX ORDER — ACETAMINOPHEN 325 MG/1
650 TABLET ORAL ONCE
Status: COMPLETED | OUTPATIENT
Start: 2025-08-01 | End: 2025-08-01

## 2025-08-01 RX ORDER — ACETAMINOPHEN 325 MG/1
650 TABLET ORAL ONCE
Status: CANCELLED | OUTPATIENT
Start: 2025-08-01 | End: 2025-08-01

## 2025-08-01 RX ORDER — ACETAMINOPHEN 325 MG/1
650 TABLET ORAL PRN
Status: CANCELLED | OUTPATIENT
Start: 2025-08-01

## 2025-08-01 RX ORDER — EPINEPHRINE 1 MG/ML(1)
0.5 AMPUL (ML) INJECTION PRN
Status: DISCONTINUED | OUTPATIENT
Start: 2025-08-01 | End: 2025-08-01 | Stop reason: HOSPADM

## 2025-08-01 RX ORDER — SODIUM CHLORIDE 9 MG/ML
INJECTION, SOLUTION INTRAVENOUS CONTINUOUS
Status: CANCELLED | OUTPATIENT
Start: 2025-08-01

## 2025-08-01 RX ORDER — DIAZEPAM 10 MG/2ML
2.5 INJECTION, SOLUTION INTRAMUSCULAR; INTRAVENOUS EVERY 6 HOURS PRN
Status: CANCELLED
Start: 2025-08-01

## 2025-08-01 RX ORDER — ONDANSETRON 8 MG/1
8 TABLET, ORALLY DISINTEGRATING ORAL PRN
Status: DISCONTINUED | OUTPATIENT
Start: 2025-08-01 | End: 2025-08-01 | Stop reason: HOSPADM

## 2025-08-01 RX ORDER — ONDANSETRON 8 MG/1
8 TABLET, ORALLY DISINTEGRATING ORAL PRN
Status: CANCELLED | OUTPATIENT
Start: 2025-08-01

## 2025-08-01 RX ORDER — ACETAMINOPHEN 325 MG/1
650 TABLET ORAL PRN
Status: DISCONTINUED | OUTPATIENT
Start: 2025-08-01 | End: 2025-08-01 | Stop reason: HOSPADM

## 2025-08-01 RX ORDER — LORAZEPAM 1 MG/1
0.5 TABLET ORAL PRN
Status: CANCELLED | OUTPATIENT
Start: 2025-08-01

## 2025-08-01 RX ORDER — ONDANSETRON 2 MG/ML
8 INJECTION INTRAMUSCULAR; INTRAVENOUS PRN
Status: CANCELLED | OUTPATIENT
Start: 2025-08-01

## 2025-08-01 RX ORDER — 0.9 % SODIUM CHLORIDE 0.9 %
VIAL (ML) INJECTION PRN
Status: DISCONTINUED | OUTPATIENT
Start: 2025-08-01 | End: 2025-08-01 | Stop reason: HOSPADM

## 2025-08-01 RX ORDER — SODIUM CHLORIDE 9 MG/ML
250 INJECTION, SOLUTION INTRAVENOUS ONCE
Status: CANCELLED
Start: 2025-08-01 | End: 2025-08-01

## 2025-08-01 RX ORDER — ONDANSETRON 2 MG/ML
8 INJECTION INTRAMUSCULAR; INTRAVENOUS PRN
Status: DISCONTINUED | OUTPATIENT
Start: 2025-08-01 | End: 2025-08-01 | Stop reason: HOSPADM

## 2025-08-01 RX ORDER — SODIUM CHLORIDE 9 MG/ML
1000 INJECTION, SOLUTION INTRAVENOUS PRN
Status: CANCELLED | OUTPATIENT
Start: 2025-08-01

## 2025-08-01 RX ORDER — 0.9 % SODIUM CHLORIDE 0.9 %
10 VIAL (ML) INJECTION PRN
Status: DISCONTINUED | OUTPATIENT
Start: 2025-08-01 | End: 2025-08-01 | Stop reason: HOSPADM

## 2025-08-01 RX ORDER — 0.9 % SODIUM CHLORIDE 0.9 %
3 VIAL (ML) INJECTION PRN
Status: DISCONTINUED | OUTPATIENT
Start: 2025-08-01 | End: 2025-08-01 | Stop reason: HOSPADM

## 2025-08-01 RX ORDER — MEPERIDINE HYDROCHLORIDE 25 MG/ML
25 INJECTION INTRAMUSCULAR; INTRAVENOUS; SUBCUTANEOUS PRN
Status: DISCONTINUED | OUTPATIENT
Start: 2025-08-01 | End: 2025-08-01 | Stop reason: HOSPADM

## 2025-08-01 RX ORDER — DIPHENHYDRAMINE HCL 25 MG
50 TABLET ORAL ONCE
Status: COMPLETED | OUTPATIENT
Start: 2025-08-01 | End: 2025-08-01

## 2025-08-01 RX ORDER — METHYLPREDNISOLONE SODIUM SUCCINATE 125 MG/2ML
125 INJECTION, POWDER, LYOPHILIZED, FOR SOLUTION INTRAMUSCULAR; INTRAVENOUS PRN
Status: DISCONTINUED | OUTPATIENT
Start: 2025-08-01 | End: 2025-08-01 | Stop reason: HOSPADM

## 2025-08-01 RX ORDER — 0.9 % SODIUM CHLORIDE 0.9 %
10 VIAL (ML) INJECTION PRN
Status: CANCELLED | OUTPATIENT
Start: 2025-08-01

## 2025-08-01 RX ORDER — EPINEPHRINE 1 MG/ML(1)
0.5 AMPUL (ML) INJECTION PRN
Status: CANCELLED | OUTPATIENT
Start: 2025-08-01

## 2025-08-01 RX ORDER — MEPERIDINE HYDROCHLORIDE 25 MG/ML
25 INJECTION INTRAMUSCULAR; INTRAVENOUS; SUBCUTANEOUS PRN
Status: CANCELLED | OUTPATIENT
Start: 2025-08-01

## 2025-08-01 RX ORDER — SODIUM CHLORIDE 9 MG/ML
1000 INJECTION, SOLUTION INTRAVENOUS PRN
Status: DISCONTINUED | OUTPATIENT
Start: 2025-08-01 | End: 2025-08-01 | Stop reason: HOSPADM

## 2025-08-01 RX ORDER — 0.9 % SODIUM CHLORIDE 0.9 %
3 VIAL (ML) INJECTION PRN
Status: CANCELLED | OUTPATIENT
Start: 2025-08-01

## 2025-08-01 RX ORDER — METHYLPREDNISOLONE SODIUM SUCCINATE 125 MG/2ML
100 INJECTION, POWDER, LYOPHILIZED, FOR SOLUTION INTRAMUSCULAR; INTRAVENOUS ONCE
Status: CANCELLED | OUTPATIENT
Start: 2025-08-01 | End: 2025-08-01

## 2025-08-01 RX ORDER — DIAZEPAM 10 MG/2ML
2.5 INJECTION, SOLUTION INTRAMUSCULAR; INTRAVENOUS EVERY 6 HOURS PRN
Status: DISCONTINUED | OUTPATIENT
Start: 2025-08-01 | End: 2025-08-01

## 2025-08-01 RX ORDER — METHYLPREDNISOLONE SODIUM SUCCINATE 125 MG/2ML
100 INJECTION, POWDER, LYOPHILIZED, FOR SOLUTION INTRAMUSCULAR; INTRAVENOUS ONCE
Status: COMPLETED | OUTPATIENT
Start: 2025-08-01 | End: 2025-08-01

## 2025-08-01 RX ORDER — DIPHENHYDRAMINE HYDROCHLORIDE 50 MG/ML
25 INJECTION, SOLUTION INTRAMUSCULAR; INTRAVENOUS PRN
Status: CANCELLED | OUTPATIENT
Start: 2025-08-01

## 2025-08-01 RX ORDER — SODIUM CHLORIDE 9 MG/ML
250 INJECTION, SOLUTION INTRAVENOUS ONCE
Status: COMPLETED | OUTPATIENT
Start: 2025-08-01 | End: 2025-08-01

## 2025-08-01 RX ORDER — DIPHENHYDRAMINE HCL 25 MG
50 TABLET ORAL ONCE
Status: CANCELLED | OUTPATIENT
Start: 2025-08-01 | End: 2025-08-01

## 2025-08-01 RX ORDER — ALBUTEROL SULFATE 5 MG/ML
2.5 SOLUTION RESPIRATORY (INHALATION) PRN
Status: DISCONTINUED | OUTPATIENT
Start: 2025-08-01 | End: 2025-08-01 | Stop reason: HOSPADM

## 2025-08-01 RX ORDER — 0.9 % SODIUM CHLORIDE 0.9 %
VIAL (ML) INJECTION PRN
Status: CANCELLED | OUTPATIENT
Start: 2025-08-01

## 2025-08-01 RX ORDER — LORAZEPAM 1 MG/1
0.5 TABLET ORAL PRN
Status: DISCONTINUED | OUTPATIENT
Start: 2025-08-01 | End: 2025-08-01 | Stop reason: HOSPADM

## 2025-08-01 RX ORDER — ALBUTEROL SULFATE 5 MG/ML
2.5 SOLUTION RESPIRATORY (INHALATION) PRN
Status: CANCELLED | OUTPATIENT
Start: 2025-08-01

## 2025-08-01 RX ORDER — DIPHENHYDRAMINE HYDROCHLORIDE 50 MG/ML
25 INJECTION, SOLUTION INTRAMUSCULAR; INTRAVENOUS PRN
Status: DISCONTINUED | OUTPATIENT
Start: 2025-08-01 | End: 2025-08-01 | Stop reason: HOSPADM

## 2025-08-01 RX ORDER — METHYLPREDNISOLONE SODIUM SUCCINATE 125 MG/2ML
125 INJECTION, POWDER, LYOPHILIZED, FOR SOLUTION INTRAMUSCULAR; INTRAVENOUS PRN
Status: CANCELLED | OUTPATIENT
Start: 2025-08-01

## 2025-08-01 RX ADMIN — ACETAMINOPHEN 650 MG: 325 TABLET ORAL at 08:48

## 2025-08-01 RX ADMIN — SODIUM CHLORIDE 250 ML: 9 INJECTION, SOLUTION INTRAVENOUS at 08:50

## 2025-08-01 RX ADMIN — IMMUNE GLOBULIN (HUMAN) 20 G: 10 INJECTION INTRAVENOUS; SUBCUTANEOUS at 09:03

## 2025-08-01 RX ADMIN — DIPHENHYDRAMINE HYDROCHLORIDE 50 MG: 25 TABLET ORAL at 08:47

## 2025-08-01 RX ADMIN — METHYLPREDNISOLONE SODIUM SUCCINATE 100 MG: 125 INJECTION INTRAMUSCULAR; INTRAVENOUS at 08:51

## 2025-08-01 NOTE — PROGRESS NOTES
Pt arrived ambulatory to [facility] for day 3 IVIG. POC discussed. PIV in place from [facility], flushes well and blood return noted. Premedications given. IVIG infused to max rate followed by 250 cc NS hydration. Pt tolerated well. PIV removed, site covered with adhesive bandage. Return appointment confirmed. Pt discharged from [facility] in NAD under self care.

## 2025-08-25 ENCOUNTER — OUTPATIENT INFUSION SERVICES (OUTPATIENT)
Facility: MEDICAL CENTER | Age: 80
End: 2025-08-25
Attending: PSYCHIATRY & NEUROLOGY
Payer: MEDICARE

## 2025-08-25 VITALS
WEIGHT: 127.43 LBS | OXYGEN SATURATION: 93 % | SYSTOLIC BLOOD PRESSURE: 114 MMHG | DIASTOLIC BLOOD PRESSURE: 67 MMHG | HEART RATE: 71 BPM | TEMPERATURE: 97.9 F | RESPIRATION RATE: 16 BRPM | HEIGHT: 64 IN | BODY MASS INDEX: 21.75 KG/M2

## 2025-08-25 DIAGNOSIS — G70.00 MYASTHENIA GRAVIS (HCC): Primary | ICD-10-CM

## 2025-08-25 PROCEDURE — 700111 HCHG RX REV CODE 636 W/ 250 OVERRIDE (IP): Mod: JZ,TB | Performed by: PSYCHIATRY & NEUROLOGY

## 2025-08-25 PROCEDURE — 96366 THER/PROPH/DIAG IV INF ADDON: CPT

## 2025-08-25 PROCEDURE — 700105 HCHG RX REV CODE 258: Performed by: PSYCHIATRY & NEUROLOGY

## 2025-08-25 PROCEDURE — 96375 TX/PRO/DX INJ NEW DRUG ADDON: CPT

## 2025-08-25 PROCEDURE — A9270 NON-COVERED ITEM OR SERVICE: HCPCS | Performed by: PSYCHIATRY & NEUROLOGY

## 2025-08-25 PROCEDURE — 96365 THER/PROPH/DIAG IV INF INIT: CPT

## 2025-08-25 PROCEDURE — 700102 HCHG RX REV CODE 250 W/ 637 OVERRIDE(OP): Performed by: PSYCHIATRY & NEUROLOGY

## 2025-08-25 RX ORDER — ALBUTEROL SULFATE 5 MG/ML
2.5 SOLUTION RESPIRATORY (INHALATION) PRN
Status: CANCELLED | OUTPATIENT
Start: 2025-08-25

## 2025-08-25 RX ORDER — SODIUM CHLORIDE 9 MG/ML
INJECTION, SOLUTION INTRAVENOUS CONTINUOUS
Status: DISCONTINUED | OUTPATIENT
Start: 2025-08-25 | End: 2025-08-25 | Stop reason: HOSPADM

## 2025-08-25 RX ORDER — 0.9 % SODIUM CHLORIDE 0.9 %
10 VIAL (ML) INJECTION PRN
Status: CANCELLED | OUTPATIENT
Start: 2025-08-25

## 2025-08-25 RX ORDER — METHYLPREDNISOLONE SODIUM SUCCINATE 125 MG/2ML
100 INJECTION, POWDER, LYOPHILIZED, FOR SOLUTION INTRAMUSCULAR; INTRAVENOUS ONCE
Status: CANCELLED | OUTPATIENT
Start: 2025-08-25 | End: 2025-08-25

## 2025-08-25 RX ORDER — SODIUM CHLORIDE 9 MG/ML
250 INJECTION, SOLUTION INTRAVENOUS ONCE
Status: COMPLETED | OUTPATIENT
Start: 2025-08-25 | End: 2025-08-25

## 2025-08-25 RX ORDER — METHYLPREDNISOLONE SODIUM SUCCINATE 125 MG/2ML
125 INJECTION, POWDER, LYOPHILIZED, FOR SOLUTION INTRAMUSCULAR; INTRAVENOUS PRN
Status: CANCELLED | OUTPATIENT
Start: 2025-08-25

## 2025-08-25 RX ORDER — DIPHENHYDRAMINE HCL 25 MG
50 TABLET ORAL ONCE
Status: COMPLETED | OUTPATIENT
Start: 2025-08-25 | End: 2025-08-25

## 2025-08-25 RX ORDER — MEPERIDINE HYDROCHLORIDE 25 MG/ML
25 INJECTION INTRAMUSCULAR; INTRAVENOUS; SUBCUTANEOUS PRN
Status: CANCELLED | OUTPATIENT
Start: 2025-08-25

## 2025-08-25 RX ORDER — 0.9 % SODIUM CHLORIDE 0.9 %
VIAL (ML) INJECTION PRN
Status: CANCELLED | OUTPATIENT
Start: 2025-08-25

## 2025-08-25 RX ORDER — ACETAMINOPHEN 325 MG/1
650 TABLET ORAL ONCE
Status: COMPLETED | OUTPATIENT
Start: 2025-08-25 | End: 2025-08-25

## 2025-08-25 RX ORDER — DIAZEPAM 10 MG/2ML
2.5 INJECTION, SOLUTION INTRAMUSCULAR; INTRAVENOUS EVERY 6 HOURS PRN
Status: CANCELLED
Start: 2025-08-25

## 2025-08-25 RX ORDER — LORAZEPAM 1 MG/1
0.5 TABLET ORAL PRN
Status: CANCELLED | OUTPATIENT
Start: 2025-08-25

## 2025-08-25 RX ORDER — ACETAMINOPHEN 325 MG/1
650 TABLET ORAL PRN
Status: CANCELLED | OUTPATIENT
Start: 2025-08-25

## 2025-08-25 RX ORDER — DIPHENHYDRAMINE HYDROCHLORIDE 50 MG/ML
25 INJECTION, SOLUTION INTRAMUSCULAR; INTRAVENOUS PRN
Status: CANCELLED | OUTPATIENT
Start: 2025-08-25

## 2025-08-25 RX ORDER — SODIUM CHLORIDE 9 MG/ML
INJECTION, SOLUTION INTRAVENOUS CONTINUOUS
Status: CANCELLED | OUTPATIENT
Start: 2025-08-25

## 2025-08-25 RX ORDER — SODIUM CHLORIDE 9 MG/ML
1000 INJECTION, SOLUTION INTRAVENOUS PRN
Status: CANCELLED | OUTPATIENT
Start: 2025-08-25

## 2025-08-25 RX ORDER — SODIUM CHLORIDE 9 MG/ML
250 INJECTION, SOLUTION INTRAVENOUS ONCE
Status: CANCELLED
Start: 2025-08-25 | End: 2025-08-25

## 2025-08-25 RX ORDER — ACETAMINOPHEN 325 MG/1
650 TABLET ORAL ONCE
Status: CANCELLED | OUTPATIENT
Start: 2025-08-25 | End: 2025-08-25

## 2025-08-25 RX ORDER — METHYLPREDNISOLONE SODIUM SUCCINATE 125 MG/2ML
100 INJECTION, POWDER, LYOPHILIZED, FOR SOLUTION INTRAMUSCULAR; INTRAVENOUS ONCE
Status: COMPLETED | OUTPATIENT
Start: 2025-08-25 | End: 2025-08-25

## 2025-08-25 RX ORDER — ONDANSETRON 2 MG/ML
8 INJECTION INTRAMUSCULAR; INTRAVENOUS PRN
Status: CANCELLED | OUTPATIENT
Start: 2025-08-25

## 2025-08-25 RX ORDER — DIPHENHYDRAMINE HCL 25 MG
50 TABLET ORAL ONCE
Status: CANCELLED | OUTPATIENT
Start: 2025-08-25 | End: 2025-08-25

## 2025-08-25 RX ORDER — EPINEPHRINE 1 MG/ML(1)
0.5 AMPUL (ML) INJECTION PRN
Status: CANCELLED | OUTPATIENT
Start: 2025-08-25

## 2025-08-25 RX ORDER — ONDANSETRON 8 MG/1
8 TABLET, ORALLY DISINTEGRATING ORAL PRN
Status: CANCELLED | OUTPATIENT
Start: 2025-08-25

## 2025-08-25 RX ORDER — 0.9 % SODIUM CHLORIDE 0.9 %
3 VIAL (ML) INJECTION PRN
Status: CANCELLED | OUTPATIENT
Start: 2025-08-25

## 2025-08-25 RX ADMIN — SODIUM CHLORIDE: 9 INJECTION, SOLUTION INTRAVENOUS at 08:58

## 2025-08-25 RX ADMIN — METHYLPREDNISOLONE SODIUM SUCCINATE 100 MG: 125 INJECTION INTRAMUSCULAR; INTRAVENOUS at 08:27

## 2025-08-25 RX ADMIN — DIPHENHYDRAMINE HYDROCHLORIDE 50 MG: 25 TABLET ORAL at 08:27

## 2025-08-25 RX ADMIN — IMMUNE GLOBULIN (HUMAN) 20 G: 10 INJECTION INTRAVENOUS; SUBCUTANEOUS at 08:56

## 2025-08-25 RX ADMIN — SODIUM CHLORIDE 250 ML: 9 INJECTION, SOLUTION INTRAVENOUS at 08:30

## 2025-08-25 RX ADMIN — ACETAMINOPHEN 650 MG: 325 TABLET ORAL at 08:27

## 2025-08-26 ENCOUNTER — OUTPATIENT INFUSION SERVICES (OUTPATIENT)
Facility: MEDICAL CENTER | Age: 80
End: 2025-08-26
Attending: PSYCHIATRY & NEUROLOGY
Payer: MEDICARE

## 2025-08-26 ENCOUNTER — APPOINTMENT (OUTPATIENT)
Dept: URBAN - METROPOLITAN AREA CLINIC 4 | Facility: CLINIC | Age: 80
Setting detail: DERMATOLOGY
End: 2025-08-26

## 2025-08-26 VITALS
OXYGEN SATURATION: 96 % | WEIGHT: 128.75 LBS | TEMPERATURE: 97 F | SYSTOLIC BLOOD PRESSURE: 102 MMHG | HEART RATE: 70 BPM | RESPIRATION RATE: 17 BRPM | BODY MASS INDEX: 21.98 KG/M2 | HEIGHT: 64 IN | DIASTOLIC BLOOD PRESSURE: 58 MMHG

## 2025-08-26 DIAGNOSIS — L80 VITILIGO: ICD-10-CM

## 2025-08-26 DIAGNOSIS — L57.0 ACTINIC KERATOSIS: ICD-10-CM

## 2025-08-26 DIAGNOSIS — D22 MELANOCYTIC NEVI: ICD-10-CM

## 2025-08-26 DIAGNOSIS — Z71.89 OTHER SPECIFIED COUNSELING: ICD-10-CM

## 2025-08-26 DIAGNOSIS — G70.00 MYASTHENIA GRAVIS (HCC): Primary | ICD-10-CM

## 2025-08-26 DIAGNOSIS — D18.0 HEMANGIOMA: ICD-10-CM

## 2025-08-26 DIAGNOSIS — L82.1 OTHER SEBORRHEIC KERATOSIS: ICD-10-CM

## 2025-08-26 DIAGNOSIS — Z85.828 PERSONAL HISTORY OF OTHER MALIGNANT NEOPLASM OF SKIN: ICD-10-CM

## 2025-08-26 DIAGNOSIS — L81.4 OTHER MELANIN HYPERPIGMENTATION: ICD-10-CM

## 2025-08-26 PROBLEM — D18.01 HEMANGIOMA OF SKIN AND SUBCUTANEOUS TISSUE: Status: ACTIVE | Noted: 2025-08-26

## 2025-08-26 PROBLEM — D22.5 MELANOCYTIC NEVI OF TRUNK: Status: ACTIVE | Noted: 2025-08-26

## 2025-08-26 PROCEDURE — ? LIQUID NITROGEN

## 2025-08-26 PROCEDURE — 96366 THER/PROPH/DIAG IV INF ADDON: CPT

## 2025-08-26 PROCEDURE — ? COUNSELING

## 2025-08-26 PROCEDURE — ? DIAGNOSIS COMMENT

## 2025-08-26 PROCEDURE — 700111 HCHG RX REV CODE 636 W/ 250 OVERRIDE (IP): Mod: TB | Performed by: PSYCHIATRY & NEUROLOGY

## 2025-08-26 PROCEDURE — 700105 HCHG RX REV CODE 258: Performed by: PSYCHIATRY & NEUROLOGY

## 2025-08-26 PROCEDURE — A9270 NON-COVERED ITEM OR SERVICE: HCPCS | Performed by: PSYCHIATRY & NEUROLOGY

## 2025-08-26 PROCEDURE — ? PRESCRIPTION

## 2025-08-26 PROCEDURE — ? MEDICATION COUNSELING

## 2025-08-26 PROCEDURE — 700102 HCHG RX REV CODE 250 W/ 637 OVERRIDE(OP): Performed by: PSYCHIATRY & NEUROLOGY

## 2025-08-26 PROCEDURE — 96365 THER/PROPH/DIAG IV INF INIT: CPT

## 2025-08-26 PROCEDURE — 96375 TX/PRO/DX INJ NEW DRUG ADDON: CPT

## 2025-08-26 PROCEDURE — ? SUNSCREEN RECOMMENDATIONS

## 2025-08-26 RX ORDER — METHYLPREDNISOLONE SODIUM SUCCINATE 125 MG/2ML
125 INJECTION, POWDER, LYOPHILIZED, FOR SOLUTION INTRAMUSCULAR; INTRAVENOUS PRN
OUTPATIENT
Start: 2025-08-26

## 2025-08-26 RX ORDER — DIAZEPAM 10 MG/2ML
2.5 INJECTION, SOLUTION INTRAMUSCULAR; INTRAVENOUS EVERY 6 HOURS PRN
Start: 2025-08-26

## 2025-08-26 RX ORDER — ONDANSETRON 8 MG/1
8 TABLET, ORALLY DISINTEGRATING ORAL PRN
OUTPATIENT
Start: 2025-08-26

## 2025-08-26 RX ORDER — DIPHENHYDRAMINE HCL 25 MG
50 TABLET ORAL ONCE
OUTPATIENT
Start: 2025-08-26 | End: 2025-08-26

## 2025-08-26 RX ORDER — 0.9 % SODIUM CHLORIDE 0.9 %
VIAL (ML) INJECTION PRN
OUTPATIENT
Start: 2025-08-26

## 2025-08-26 RX ORDER — ONDANSETRON 2 MG/ML
8 INJECTION INTRAMUSCULAR; INTRAVENOUS PRN
OUTPATIENT
Start: 2025-08-26

## 2025-08-26 RX ORDER — METHYLPREDNISOLONE SODIUM SUCCINATE 125 MG/2ML
100 INJECTION, POWDER, LYOPHILIZED, FOR SOLUTION INTRAMUSCULAR; INTRAVENOUS ONCE
OUTPATIENT
Start: 2025-08-26 | End: 2025-08-26

## 2025-08-26 RX ORDER — TRIAMCINOLONE ACETONIDE 1 MG/G
CREAM TOPICAL BID
Qty: 80 | Refills: 1 | Status: ERX | COMMUNITY
Start: 2025-08-26

## 2025-08-26 RX ORDER — METHYLPREDNISOLONE SODIUM SUCCINATE 125 MG/2ML
100 INJECTION, POWDER, LYOPHILIZED, FOR SOLUTION INTRAMUSCULAR; INTRAVENOUS ONCE
Status: COMPLETED | OUTPATIENT
Start: 2025-08-26 | End: 2025-08-26

## 2025-08-26 RX ORDER — 0.9 % SODIUM CHLORIDE 0.9 %
3 VIAL (ML) INJECTION PRN
OUTPATIENT
Start: 2025-08-26

## 2025-08-26 RX ORDER — MEPERIDINE HYDROCHLORIDE 25 MG/ML
25 INJECTION INTRAMUSCULAR; INTRAVENOUS; SUBCUTANEOUS PRN
OUTPATIENT
Start: 2025-08-26

## 2025-08-26 RX ORDER — EPINEPHRINE 1 MG/ML(1)
0.5 AMPUL (ML) INJECTION PRN
OUTPATIENT
Start: 2025-08-26

## 2025-08-26 RX ORDER — ACETAMINOPHEN 325 MG/1
650 TABLET ORAL ONCE
Status: COMPLETED | OUTPATIENT
Start: 2025-08-26 | End: 2025-08-26

## 2025-08-26 RX ORDER — 0.9 % SODIUM CHLORIDE 0.9 %
10 VIAL (ML) INJECTION PRN
OUTPATIENT
Start: 2025-08-26

## 2025-08-26 RX ORDER — ACETAMINOPHEN 325 MG/1
650 TABLET ORAL PRN
OUTPATIENT
Start: 2025-08-26

## 2025-08-26 RX ORDER — LORAZEPAM 1 MG/1
0.5 TABLET ORAL PRN
OUTPATIENT
Start: 2025-08-26

## 2025-08-26 RX ORDER — ALBUTEROL SULFATE 5 MG/ML
2.5 SOLUTION RESPIRATORY (INHALATION) PRN
OUTPATIENT
Start: 2025-08-26

## 2025-08-26 RX ORDER — SODIUM CHLORIDE 9 MG/ML
250 INJECTION, SOLUTION INTRAVENOUS ONCE
Start: 2025-08-26 | End: 2025-08-26

## 2025-08-26 RX ORDER — SODIUM CHLORIDE 9 MG/ML
INJECTION, SOLUTION INTRAVENOUS CONTINUOUS
OUTPATIENT
Start: 2025-08-26

## 2025-08-26 RX ORDER — DIPHENHYDRAMINE HYDROCHLORIDE 50 MG/ML
25 INJECTION, SOLUTION INTRAMUSCULAR; INTRAVENOUS PRN
OUTPATIENT
Start: 2025-08-26

## 2025-08-26 RX ORDER — DIPHENHYDRAMINE HCL 25 MG
50 TABLET ORAL ONCE
Status: COMPLETED | OUTPATIENT
Start: 2025-08-26 | End: 2025-08-26

## 2025-08-26 RX ORDER — SODIUM CHLORIDE 9 MG/ML
250 INJECTION, SOLUTION INTRAVENOUS ONCE
Status: COMPLETED | OUTPATIENT
Start: 2025-08-26 | End: 2025-08-26

## 2025-08-26 RX ORDER — ACETAMINOPHEN 325 MG/1
650 TABLET ORAL ONCE
OUTPATIENT
Start: 2025-08-26 | End: 2025-08-26

## 2025-08-26 RX ORDER — TACROLIMUS 1 MG/G
OINTMENT TOPICAL
Qty: 100 | Refills: 0 | Status: ERX | COMMUNITY
Start: 2025-08-26

## 2025-08-26 RX ORDER — SODIUM CHLORIDE 9 MG/ML
1000 INJECTION, SOLUTION INTRAVENOUS PRN
OUTPATIENT
Start: 2025-08-26

## 2025-08-26 RX ADMIN — IMMUNE GLOBULIN (HUMAN) 20 G: 10 INJECTION INTRAVENOUS; SUBCUTANEOUS at 11:55

## 2025-08-26 RX ADMIN — TRIAMCINOLONE ACETONIDE: 1 CREAM TOPICAL at 00:00

## 2025-08-26 RX ADMIN — DIPHENHYDRAMINE HYDROCHLORIDE 50 MG: 25 TABLET ORAL at 11:33

## 2025-08-26 RX ADMIN — SODIUM CHLORIDE 250 ML: 9 INJECTION, SOLUTION INTRAVENOUS at 11:34

## 2025-08-26 RX ADMIN — METHYLPREDNISOLONE SODIUM SUCCINATE 100 MG: 125 INJECTION INTRAMUSCULAR; INTRAVENOUS at 11:34

## 2025-08-26 RX ADMIN — ACETAMINOPHEN 650 MG: 325 TABLET ORAL at 11:33

## 2025-08-26 RX ADMIN — TACROLIMUS: 1 OINTMENT TOPICAL at 00:00

## 2025-08-26 ASSESSMENT — LOCATION SIMPLE DESCRIPTION DERM
LOCATION SIMPLE: RIGHT FOREARM
LOCATION SIMPLE: RIGHT LIP
LOCATION SIMPLE: LEFT PRETIBIAL REGION
LOCATION SIMPLE: LEFT LIP
LOCATION SIMPLE: UPPER BACK
LOCATION SIMPLE: LEFT UPPER BACK
LOCATION SIMPLE: RIGHT HAND
LOCATION SIMPLE: LEFT SHOULDER
LOCATION SIMPLE: LEFT CLAVICULAR SKIN
LOCATION SIMPLE: RIGHT ANTERIOR NECK
LOCATION SIMPLE: RIGHT UPPER BACK
LOCATION SIMPLE: CHEST
LOCATION SIMPLE: LEFT HAND
LOCATION SIMPLE: RIGHT SHOULDER

## 2025-08-26 ASSESSMENT — LOCATION DETAILED DESCRIPTION DERM
LOCATION DETAILED: RIGHT SUPERIOR UPPER BACK
LOCATION DETAILED: RIGHT CLAVICULAR NECK
LOCATION DETAILED: RIGHT LOWER CUTANEOUS LIP
LOCATION DETAILED: LEFT SUPERIOR UPPER BACK
LOCATION DETAILED: INFERIOR THORACIC SPINE
LOCATION DETAILED: LEFT LOWER CUTANEOUS LIP
LOCATION DETAILED: LEFT DISTAL PRETIBIAL REGION
LOCATION DETAILED: RIGHT RADIAL DORSAL HAND
LOCATION DETAILED: UPPER STERNUM
LOCATION DETAILED: RIGHT DISTAL DORSAL FOREARM
LOCATION DETAILED: RIGHT POSTERIOR SHOULDER
LOCATION DETAILED: LEFT CLAVICULAR SKIN
LOCATION DETAILED: LEFT RADIAL DORSAL HAND
LOCATION DETAILED: LEFT POSTERIOR SHOULDER

## 2025-08-26 ASSESSMENT — LOCATION ZONE DERM
LOCATION ZONE: TRUNK
LOCATION ZONE: LEG
LOCATION ZONE: ARM
LOCATION ZONE: HAND
LOCATION ZONE: LIP
LOCATION ZONE: NECK